# Patient Record
Sex: FEMALE | Race: WHITE | NOT HISPANIC OR LATINO | Employment: FULL TIME | URBAN - METROPOLITAN AREA
[De-identification: names, ages, dates, MRNs, and addresses within clinical notes are randomized per-mention and may not be internally consistent; named-entity substitution may affect disease eponyms.]

---

## 2022-10-18 DIAGNOSIS — Z30.41 ENCOUNTER FOR SURVEILLANCE OF CONTRACEPTIVE PILLS: Primary | ICD-10-CM

## 2022-10-18 RX ORDER — NORETHINDRONE ACETATE AND ETHINYL ESTRADIOL 1.5-30(21)
1 KIT ORAL DAILY
Qty: 84 TABLET | Refills: 1 | Status: SHIPPED | OUTPATIENT
Start: 2022-10-18 | End: 2022-11-30

## 2022-11-07 DIAGNOSIS — Z20.822 COVID-19 RULED OUT BY LABORATORY TESTING: Primary | ICD-10-CM

## 2022-12-05 RX ORDER — SODIUM CHLORIDE, SODIUM LACTATE, POTASSIUM CHLORIDE, CALCIUM CHLORIDE 600; 310; 30; 20 MG/100ML; MG/100ML; MG/100ML; MG/100ML
125 INJECTION, SOLUTION INTRAVENOUS CONTINUOUS
Status: CANCELLED | OUTPATIENT
Start: 2022-12-05

## 2023-01-06 ENCOUNTER — APPOINTMENT (OUTPATIENT)
Dept: PHYSICAL THERAPY | Facility: CLINIC | Age: 48
End: 2023-01-06

## 2023-01-13 ENCOUNTER — APPOINTMENT (OUTPATIENT)
Dept: PHYSICAL THERAPY | Facility: CLINIC | Age: 48
End: 2023-01-13

## 2023-01-19 ENCOUNTER — APPOINTMENT (OUTPATIENT)
Dept: PHYSICAL THERAPY | Facility: CLINIC | Age: 48
End: 2023-01-19

## 2023-02-07 ENCOUNTER — OFFICE VISIT (OUTPATIENT)
Dept: PHYSICAL THERAPY | Facility: CLINIC | Age: 48
End: 2023-02-07

## 2023-02-07 DIAGNOSIS — Z98.890 S/P RIGHT ROTATOR CUFF REPAIR: Primary | ICD-10-CM

## 2023-02-07 DIAGNOSIS — G89.29 CHRONIC RIGHT SHOULDER PAIN: ICD-10-CM

## 2023-02-07 DIAGNOSIS — M25.511 CHRONIC RIGHT SHOULDER PAIN: ICD-10-CM

## 2023-02-07 NOTE — PROGRESS NOTES
Daily Note     Today's date: 2023  Patient name: Ofelia Washburn  : 1975  MRN: [id number]  Referring provider: Abby Toscano*  Dx:   Encounter Diagnosis     ICD-10-CM    1  S/P right rotator cuff repair  Z98 890       2  Chronic right shoulder pain  M25 511     G89 29                      Subjective: Pt reports that she is feeling well this am  Denies any pain  IS pleased with her progress as she is able to move are a little more this morning  Objective: See treatment diary below      Assessment: Tolerated treatment well  Patient demos good motion passively; requires additional strengthening of shoulder muscles in order to be able to engage arm in AROM with no pain or limitations  Pt will continue to benefit from skilled physical therapy in order to optimize strength and improve quality of life  Plan: Continue per plan of care  Precautions: S/P R RTC and biceps tenodesis 2022    BILL 1 UNIT TE ONLY (until )     Past Medical History:   Diagnosis Date   • Anesthesia complication     pt can still hear, describe whats going on in the room, and feel pressure last 2 surgeries  • Asthma    • Candidiasis of mouth     LA   2/26/15    R    16     • Exposure to viral disease     LA   16   R    3/24/16     • Otalgia 2012    LA   12    R   16         Insurance:  AMA/CMS Eval/ Re-eval POC expires Priscila Langford #/ Referral # Total   Visits  Start date  Expiration date Extension  Visit limitation? PT only or  PT+OT?  Co-Insurance   [employer] 12/22 3/16  No auth required     [facility] PT $20 Co-pay                                                               Surgery date:     Date 23 (6 weeks post op 23) 2023   Visit Number 9 10 11 12 13 14            Manual         Scap mobs          Mi of serratus ant and subscap  MI of serratus ant and subscap  MI of serratus ant and subscap  MI of serratus ant and subscap Prescription sent by MD MI of serratus ant and subscap  MI serratus ant and subscap    Gentle GH and AC joint mobility     Performed performed                     Neuro Re-ed         Scap retract  rev 2x10 YTB  2x10 YTB  2 x 10 YTB    Shoulder isos   3sx10 each       Serratus push up on wall     2x10  2x10  2 x 10    Wall slides with towel     2x10  2x10  2 x 10                      Thera Ex         Pulleys  Flexion: 15x,  Retraction: 15x Flexion: 15x,  Retraction: 15x Flexion: 15x,  Retraction: 15x Flexion: 15x,  Retraction: 15x Flexion: 15x,  Retraction: 15x Flexion 15x  rectraction 15x    Shoulder PROM Performed all motions  Performed all motions  Performed all motions  Performed all motions  Performed all motions  Performed all motions    Pendulums  Reviewed         PASSIVE elbow flex/ext  20 x AROM 20 x AROM 20 x AROM  20 x AROM 20 x AROM              Posterior capsule stretch         Self hands clasped flexion          Cane ER Standing: 15x (IR/ER)  Supine 15 x  Standing: 15x (IR/ER)  Supine 15 x  Standing: 15x (IR/ER)  Supine 15 x  Standing: 15x (IR/ER)  Supine 15 x  Standing: 15x (IR/ER)  Supine 15 x  Standing 15x  (IR/ER supine 15x )    Ball rolls on table 20x         Prone row         TB shoulder ext     x10 YTB  X 10 YTB   TB IR/ER walkouts     x10 YTB  X 10 YTB   Cane abd in standing  x10 in standing x10 in standing x10 in standing x10 in standing X 10 in standing    IR BTB with SOS  2x10  2x10  2x10  2x10  2 x 10    Cane ext   2x10  2x10  2x10  2x10  2 x 10    Thera Activity         Patient education         Posture education                                                      Modalities         Heat pre         Ice post

## 2023-02-09 ENCOUNTER — OFFICE VISIT (OUTPATIENT)
Dept: PHYSICAL THERAPY | Facility: CLINIC | Age: 48
End: 2023-02-09

## 2023-02-09 DIAGNOSIS — Z98.890 S/P RIGHT ROTATOR CUFF REPAIR: Primary | ICD-10-CM

## 2023-02-09 DIAGNOSIS — M25.511 CHRONIC RIGHT SHOULDER PAIN: ICD-10-CM

## 2023-02-09 DIAGNOSIS — G89.29 CHRONIC RIGHT SHOULDER PAIN: ICD-10-CM

## 2023-02-09 NOTE — PROGRESS NOTES
Daily Note     Today's date: 2023  Patient name: Ernestina Cornejo  : 1975  MRN: [id number]  Referring provider: Carlo Essex*  Dx:   Encounter Diagnosis     ICD-10-CM    1  S/P right rotator cuff repair  Z98 890       2  Chronic right shoulder pain  M25 511     G89 29           Start Time: 0800  Stop Time: 0845  Total time in clinic (min): 45 minutes    Subjective: Patient was able to wash her hair this morning for the first time with her R UE  Objective: See treatment diary below      Assessment: Tolerated treatment well  Updated exercises today, as she is 8 weeks post operatively  She is able to initiate early strengthening phase of protocol  She is progressing well at this point, with slight pain at available end range  Will continue to progress as able  Plan: Continue per plan of care  Precautions: S/P R RTC and biceps tenodesis 2022    BILL 1 UNIT TE ONLY (until )     Past Medical History:   Diagnosis Date   • Anesthesia complication     pt can still hear, describe whats going on in the room, and feel pressure last 2 surgeries  • Asthma    • Candidiasis of mouth     LA   2/26/15    R    16     • Exposure to viral disease     LA   16   R    3/24/16     • Otalgia 2012    LA   12    R   16         Insurance:  AMA/CMS Eval/ Re-eval POC expires Joaquim Chaves #/ Referral # Total   Visits  Start date  Expiration date Extension  Visit limitation? PT only or  PT+OT?  Co-Insurance   [employer] 12/22 3/16  No auth required     [facility] PT $20 Co-pay                                                               Surgery date:     Date 2023   Visit Number 12 13 15 16          Manual       Scap mobs        MI of serratus ant and subscap  MI of serratus ant and subscap  MI serratus ant and subscap  MI serratus ant and subscap    Gentle GH and AC joint mobility  Performed performed performed                 Neuro Re-ed Scap retract  2x10 YTB  2 x 10 YTB  2 x 10 YTB    Shoulder isos       Serratus push up on wall  2x10  2x10  2 x 10  2x10   Wall slides with towel  2x10  2x10  2 x 10  2x10                 Thera Ex       Pulleys  Flexion: 15x,  Retraction: 15x Flexion: 15x,  Retraction: 15x Flexion 15x  rectraction 15x  Flexion 15x  rectraction 15x    Shoulder PROM Performed all motions  Performed all motions  Performed all motions  Performed all motions    Pendulums        PASSIVE elbow flex/ext   20 x AROM 20 x AROM             Posterior capsule stretch       Self hands clasped flexion        Cane ER Standing: 15x (IR/ER)  Supine 15 x  Standing: 15x (IR/ER)  Supine 15 x  Standing 15x  (IR/ER supine 15x )  Standing 15x   TB shoulder ext  x10 YTB  X 10 YTB 2x10 YTB   TB IR/ER walkouts  x10 YTB  X 10 YTB 2x10 YTB   Cane abd in standing x10 in standing x10 in standing X 10 in standing  2x10 in standing   IR BTB with SOS 2x10  2x10  2 x 10  2 x 10    Cane ext  2x10  2x10  2 x 10  2 x 10    ER is SL    2x10   Prone ext    2x10    Prone row    2x10          Thera Activity       Patient education       Posture education                                          Modalities       Heat pre       Ice post

## 2023-02-14 ENCOUNTER — OFFICE VISIT (OUTPATIENT)
Dept: PHYSICAL THERAPY | Facility: CLINIC | Age: 48
End: 2023-02-14

## 2023-02-14 DIAGNOSIS — M25.511 CHRONIC RIGHT SHOULDER PAIN: ICD-10-CM

## 2023-02-14 DIAGNOSIS — Z98.890 S/P RIGHT ROTATOR CUFF REPAIR: Primary | ICD-10-CM

## 2023-02-14 DIAGNOSIS — G89.29 CHRONIC RIGHT SHOULDER PAIN: ICD-10-CM

## 2023-02-14 NOTE — PROGRESS NOTES
Daily Note     Today's date: 2023  Patient name: Jing Hunter  : 1975  MRN: [id number]  Referring provider: Flor Bishop*  Dx:   Encounter Diagnosis     ICD-10-CM    1  S/P right rotator cuff repair  Z98 890       2  Chronic right shoulder pain  M25 511     G89 29           Start Time: 0800  Stop Time: 0845  Total time in clinic (min): 45 minutes    Subjective: Patient is almost able to wash her hair independently  Objective: See treatment diary below      Assessment: Tolerated treatment well  Patient notes improvements in her R shoulder mobility since starting PT intervention  She continues to lack R shoulder ER and flexion end ranges, but is progressing well at this point of the protocol  Will continue to progress as able  Plan: Continue per plan of care  Precautions: S/P R RTC and biceps tenodesis 2022    BILL 1 UNIT TE ONLY (until )     Past Medical History:   Diagnosis Date   • Anesthesia complication     pt can still hear, describe whats going on in the room, and feel pressure last 2 surgeries  • Asthma    • Candidiasis of mouth     LA   2/26/15    R    16     • Exposure to viral disease     LA   16   R    3/24/16     • Otalgia 2012    LA   12    R   16         Insurance:  AMA/CMS Eval/ Re-eval POC expires Jinny Bidding #/ Referral # Total   Visits  Start date  Expiration date Extension  Visit limitation? PT only or  PT+OT?  Co-Insurance   [employer] 12/22 3/16  No auth required     [facility] PT $20 Co-pay                                                               Surgery date:     Date 2023   Visit Number 17 17 14 17 16           Manual        Scap mobs         MI of serratus ant and subscap  MI of serratus ant and subscap  MI serratus ant and subscap  MI serratus ant and subscap  MI serratus ant and subscap    Gentle GH and AC joint mobility  Performed performed performed performed Neuro Re-ed        Scap retract  2x10 YTB  2 x 10 YTB  2 x 10 YTB  2 x 10 YTB    Shoulder isos        Serratus push up on wall  2x10  2x10  2 x 10  2x10 2x10   Wall slides with towel  2x10  2x10  2 x 10  2x10 2x10                   Thera Ex        Pulleys  Flexion: 15x,  Retraction: 15x Flexion: 15x,  Retraction: 15x Flexion 15x  rectraction 15x  Flexion 15x  rectraction 15x  Flexion 15x  rectraction 15x    Shoulder PROM Performed all motions  Performed all motions  Performed all motions  Performed all motions  Performed all motions    Pendulums         PASSIVE elbow flex/ext   20 x AROM 20 x AROM   20 x AROM  2#            Posterior capsule stretch        Self hands clasped flexion         Cane ER Standing: 15x (IR/ER)  Supine 15 x  Standing: 15x (IR/ER)  Supine 15 x  Standing 15x  (IR/ER supine 15x )  Standing 15x Standing 15x   TB shoulder ext  x10 YTB  X 10 YTB 2x10 YTB 2x10 YTB   TB IR/ER walkouts  x10 YTB  X 10 YTB 2x10 YTB 2x10 YTB   Cane abd in standing x10 in standing x10 in standing X 10 in standing  2x10 in standing 2x10 in standing   IR BTB with SOS 2x10  2x10  2 x 10  2 x 10  2 x 10    Cane ext  2x10  2x10  2 x 10  2 x 10  2 x 10    ER is SL    2x10 2 x 10    Prone ext    2x10  2 x 10    Prone row    2x10 2 x 10            Thera Activity        Patient education        Posture education                                                Modalities        Heat pre        Ice post

## 2023-02-16 ENCOUNTER — OFFICE VISIT (OUTPATIENT)
Dept: PHYSICAL THERAPY | Facility: CLINIC | Age: 48
End: 2023-02-16

## 2023-02-16 DIAGNOSIS — M25.511 CHRONIC RIGHT SHOULDER PAIN: ICD-10-CM

## 2023-02-16 DIAGNOSIS — Z98.890 S/P RIGHT ROTATOR CUFF REPAIR: Primary | ICD-10-CM

## 2023-02-16 DIAGNOSIS — G89.29 CHRONIC RIGHT SHOULDER PAIN: ICD-10-CM

## 2023-02-16 NOTE — PROGRESS NOTES
Daily Note     Today's date: 2023  Patient name: Cecilia Morelos  : 1975  MRN: [id number]  Referring provider: Romayne Rama*  Dx:   Encounter Diagnosis     ICD-10-CM    1  S/P right rotator cuff repair  Z98 890       2  Chronic right shoulder pain  M25 511     G89 29           Start Time: 0800  Stop Time: 0845  Total time in clinic (min): 45 minutes    Subjective: Patient has no new complaints  Objective: See treatment diary below      Assessment: Tolerated treatment well  Patient notes improvements in her R shoulder mobility since starting PT intervention  She demonstrates excellent R shoulder flexion ROM today despite increased pain with R shoulder ER when at neutral arm position  Will continue to progress ROM and strength as able  Plan: Continue per plan of care  Precautions: S/P R RTC and biceps tenodesis 2022    BILL 1 UNIT TE ONLY (until )     Past Medical History:   Diagnosis Date   • Anesthesia complication     pt can still hear, describe whats going on in the room, and feel pressure last 2 surgeries  • Asthma    • Candidiasis of mouth     LA   2/26/15    R    16     • Exposure to viral disease     LA   16   R    3/24/16     • Otalgia 2012    LA   12    R   16         Insurance:  AMA/CMS Eval/ Re-eval POC expires Malva Manus #/ Referral # Total   Visits  Start date  Expiration date Extension  Visit limitation? PT only or  PT+OT?  Co-Insurance   [employer] 12/22 3/16  No auth required     [facility] PT $20 Co-pay                                                               Surgery date:     Date 2023   Visit Number 12 15 17 16 16           Manual        Scap mobs         MI of serratus ant and subscap  MI serratus ant and subscap  MI serratus ant and subscap  MI serratus ant and subscap     Gentle GH and AC joint mobility Performed performed performed performed                    Neuro Re-ed Scap retract   2 x 10 YTB  2 x 10 YTB  2 x 10 YTB  2 x 10 YTB    Shoulder isos        Serratus push up on wall  2x10  2 x 10  2x10 2x10 2x10   Wall slides with towel  2x10  2 x 10  2x10 2x10 2x10                   Thera Ex        Pulleys  Flexion: 15x,  Retraction: 15x Flexion 15x  rectraction 15x  Flexion 15x  rectraction 15x  Flexion 15x  rectraction 15x  Flexion 15x  rectraction 15x    Shoulder PROM Performed all motions  Performed all motions  Performed all motions  Performed all motions  Performed all motions    Pendulums         PASSIVE elbow flex/ext  20 x AROM 20 x AROM   20 x AROM  2# 20 x AROM  2#            Posterior capsule stretch        Self hands clasped flexion         Cane ER Standing: 15x (IR/ER)  Supine 15 x  Standing 15x  (IR/ER supine 15x )  Standing 15x Standing 15x Standing 15x   TB shoulder ext   X 10 YTB 2x10 YTB 2x10 YTB 2x10 YTB   TB IR/ER walkouts   X 10 YTB 2x10 YTB 2x10 YTB 2x10 YTB   Cane abd in standing x10 in standing X 10 in standing  2x10 in standing 2x10 in standing 2x10 in standing   IR BTB with SOS 2x10  2 x 10  2 x 10  2 x 10  2 x 10    Cane ext  2x10  2 x 10  2 x 10  2 x 10  2 x 10    ER is SL   2x10 2 x 10  2 x 10    Prone ext   2x10  2 x 10  2 x 10    Prone row   2x10 2 x 10  2 x 10            Thera Activity        Patient education        Posture education                                                Modalities        Heat pre        Ice post

## 2023-02-21 ENCOUNTER — OFFICE VISIT (OUTPATIENT)
Dept: PHYSICAL THERAPY | Facility: CLINIC | Age: 48
End: 2023-02-21

## 2023-02-21 DIAGNOSIS — G89.29 CHRONIC RIGHT SHOULDER PAIN: ICD-10-CM

## 2023-02-21 DIAGNOSIS — Z98.890 S/P RIGHT ROTATOR CUFF REPAIR: Primary | ICD-10-CM

## 2023-02-21 DIAGNOSIS — M25.511 CHRONIC RIGHT SHOULDER PAIN: ICD-10-CM

## 2023-02-21 NOTE — PROGRESS NOTES
Daily Note     Today's date: 2023  Patient name: Gloria Galdamez  : 1975  MRN: [id number]  Referring provider: Perry Pearson*  Dx:   Encounter Diagnosis     ICD-10-CM    1  S/P right rotator cuff repair  Z98 890       2  Chronic right shoulder pain  M25 511     G89 29           Start Time: 0840  Stop Time: 0930  Total time in clinic (min): 50 minutes    Subjective: Patient has no new complaints  She is doing her first painting coming up  Objective: See treatment diary below      Assessment: Tolerated treatment well  Patient notes improvements in her R shoulder mobility since starting PT intervention  Significant tightness of subscapularis and lat noted with overhead mobility activities  Will continue to progress ROM and strength as able  Plan: Continue per plan of care  Precautions: S/P R RTC and biceps tenodesis 2022    BILL 1 UNIT TE ONLY (until )     Past Medical History:   Diagnosis Date   • Anesthesia complication     pt can still hear, describe whats going on in the room, and feel pressure last 2 surgeries  • Asthma    • Candidiasis of mouth     LA   2/26/15    R    16     • Exposure to viral disease     LA   16   R    3/24/16     • Otalgia 2012    LA   12    R   16         Insurance:  AMA/CMS Eval/ Re-eval POC expires Tamara Quezada #/ Referral # Total   Visits  Start date  Expiration date Extension  Visit limitation? PT only or  PT+OT?  Co-Insurance   [employer] 12/22 3/16  No auth required     [facility] PT $20 Co-pay                                                               Surgery date:     Date 2023   Visit Number 12 15 17 16 13 23            Manual         Scap mobs          MI of serratus ant and subscap  MI serratus ant and subscap  MI serratus ant and subscap  MI serratus ant and subscap   MI serratus ant and subscap    Gentle GH and AC joint mobility Performed performed performed performed  performed                     Neuro Re-ed         Scap retract   2 x 10 YTB  2 x 10 YTB  2 x 10 YTB  2 x 10 YTB  2 x 10 YTB    Shoulder isos         Serratus push up on wall  2x10  2 x 10  2x10 2x10 2x10 2x10   Wall slides with towel  2x10  2 x 10  2x10 2x10 2x10 2x10                     Thera Ex         Pulleys  Flexion: 15x,  Retraction: 15x Flexion 15x  rectraction 15x  Flexion 15x  rectraction 15x  Flexion 15x  rectraction 15x  Flexion 15x  rectraction 15x  Flexion 15x  rectraction 15x    Shoulder PROM Performed all motions  Performed all motions  Performed all motions  Performed all motions  Performed all motions  Performed all motions    Pendulums          PASSIVE elbow flex/ext  20 x AROM 20 x AROM   20 x AROM  2# 20 x AROM  2# 20 x AROM  2#             Posterior capsule stretch         Self hands clasped flexion          Cane ER Standing: 15x (IR/ER)  Supine 15 x  Standing 15x  (IR/ER supine 15x )  Standing 15x Standing 15x Standing 15x Standing 15x   TB shoulder ext   X 10 YTB 2x10 YTB 2x10 YTB 2x10 YTB 2x10 YTB   TB IR/ER walkouts   X 10 YTB 2x10 YTB 2x10 YTB 2x10 YTB 2x10 YTB   Cane abd in standing x10 in standing X 10 in standing  2x10 in standing 2x10 in standing 2x10 in standing 2x10 in standing   IR BTB with SOS 2x10  2 x 10  2 x 10  2 x 10  2 x 10  2 x 10    Cane ext  2x10  2 x 10  2 x 10  2 x 10  2 x 10  2 x 10    ER is SL   2x10 2 x 10  2 x 10  2 x 10    Prone ext   2x10  2 x 10  2 x 10  2 x 10    Prone row   2x10 2 x 10  2 x 10  2 x 10             Thera Activity         Patient education         Posture education                                                      Modalities         Heat pre         Ice post

## 2023-02-22 NOTE — PROGRESS NOTES
Diagnoses and all orders for this visit:    Grade II hemorrhoids       Grade II hemorrhoids  Patient presents for evaluation of hemorrhoidal concerns  Patient notes he has intermittent burning or itching  She notes she had an episode of hemorrhoidal prolapse many months ago and since then she has remnant skin tags  Patient also notes she has had some degree of hemorrhoidal concerns since childhood  Physical exam shows to grade 2 internal/external hemorrhoidal columns  We discussed her history and examination  Excisional hemorrhoidectomy would be the only treatment available to treat her internal/external hemorrhoidal columns  Patient has very minimal bleeding from this and does have more symptoms of intermittent discomfort that may be more pleuritic in nature and not respond well to surgical intervention  Patient does describe that she had a hard painful lump many months ago  I suspect this was a thrombosed external hemorrhoid  As such there would be no indication acutely for intervention for this kind of history  I do think that surgery would have some role in her symptomatology but we discussed that this is a very unpleasant procedure to have done and would only have some benefit into her symptoms  At this point in time we will observe and she will call if symptoms worsen over time  HPI   Macho Jules is here today for evaluation of hemorrhoids  Patient complains of internal hemorrhoid that prolapses causing some discomfort  Family history of colon cancer in her mother at age 61  Her most recent colonoscopy was 12/6/2022 with Dr Jayme Youngblood, which showed mild rectal polyp removed  Grade II internal hemorrhoids  Normal colonic mucosa  Recall colonoscopy 5 years  Past Medical History:   Diagnosis Date   • Anesthesia complication     pt can still hear, describe whats going on in the room, and feel pressure last 2 surgeries  • Asthma    • Candidiasis of mouth     LA   2/26/15 R  Dara Soulier Dara Soulier Dara Marilinjaniya Shella Soulier 16     • Exposure to viral disease     LA   16   R    3/24/16     • Otalgia 2012    LA   12    R   16       Past Surgical History:   Procedure Laterality Date   •  SECTION     • DILATION AND EVACUATION     • NASAL FRACTURE SURGERY     • PA SURGICAL ARTHROSCOPY SHOULDER W/ROTATOR CUFF RPR Right 2022    Procedure: Shoulder Arthroscopy with Rotator Cuff Repair, Biceps tenodesis, and subacromial decompression;  Surgeon: Jeannie Hernandez MD;  Location: [address] [place] [address];  Service: Orthopedics   • TONSILLECTOMY     • WRIST GANGLION EXCISION Left        Current Outpatient Medications:   •  albuterol (PROVENTIL HFA,VENTOLIN HFA) 90 mcg/act inhaler, INHALE 2 PUFFS EVERY 6 HOURS AS NEEDED FOR WHEEZING, Disp: 18 g, Rfl: 1  •  Dulera 100-5 MCG/ACT inhaler, INHALE 2 PUFFS 2 (TWO) TIMES A DAY RINSE MOUTH AFTER USE  (Patient taking differently: 2 (two) times a day as needed), Disp: 39 Inhaler, Rfl: 1  •  EPINEPHrine (EPIPEN) 0 3 mg/0 3 mL SOAJ, Inject 0 3 mL (0 3 mg total) into a muscle once for 1 dose, Disp: 0 6 mL, Rfl: 0  •  levocetirizine (XYZAL) 5 MG tablet, Take 1 tablet (5 mg total) by mouth daily, Disp: 90 tablet, Rfl: 1  •  ZOLMitriptan (ZOMIG) 5 MG tablet, TAKE 1 TABLET BY MOUTH EVERY DAY AS NEEDED FOR MIGRAINE, Disp: 6 tablet, Rfl: 3  Allergies as of 2023 - Reviewed 2023   Allergen Reaction Noted   • Treenut [nuts - food allergy] Anaphylaxis 2022     Review of Systems   Gastrointestinal: Positive for anal bleeding and rectal pain  All other systems reviewed and are negative  There were no vitals filed for this visit  Physical Exam  Constitutional:       Appearance: Normal appearance  HENT:      Head: Normocephalic and atraumatic  Eyes:      Extraocular Movements: Extraocular movements intact  Pupils: Pupils are equal, round, and reactive to light  Musculoskeletal:         General: Normal range of motion     Skin:     General: Skin is warm and dry  Neurological:      General: No focal deficit present  Mental Status: She is alert and oriented to person, place, and time  Psychiatric:         Mood and Affect: Mood normal          Behavior: Behavior normal          Thought Content: Thought content normal          Judgment: Judgment normal      Lower Endoscopy    Date/Time: 2/24/2023 4:45 PM  Performed by: Leydi Menard MD  Authorized by: Leydi Menard MD     Verbal consent obtained?: Yes    Risks and benefits: Risks, benefits and alternatives were discussed    Consent given by:  Patient  Indications: hemorrhoids    Patient sedated: No    Scope type:   Anoscope  External exam performed: Yes    External hemorrhoids: Yes    Digital exam performed: Yes    Laxity of anal sphincter: No    Internal hemorrhoids: Yes    Prolapsed: No    Procedure termination:  Procedure complete  Patient tolerance:  Patient tolerated the procedure well with no immediate complications   Grade 2 internal/external hemorrhoidal columns in the left lateral and right anterior lateral positions

## 2023-02-23 ENCOUNTER — OFFICE VISIT (OUTPATIENT)
Dept: PHYSICAL THERAPY | Facility: CLINIC | Age: 48
End: 2023-02-23

## 2023-02-23 DIAGNOSIS — Z98.890 S/P RIGHT ROTATOR CUFF REPAIR: Primary | ICD-10-CM

## 2023-02-23 DIAGNOSIS — M25.511 CHRONIC RIGHT SHOULDER PAIN: ICD-10-CM

## 2023-02-23 DIAGNOSIS — G89.29 CHRONIC RIGHT SHOULDER PAIN: ICD-10-CM

## 2023-02-23 NOTE — PROGRESS NOTES
Daily Note     Today's date: 2023  Patient name: Taylor Rose  : 1975  MRN: [id number]  Referring provider: Cesar Lagunas*  Dx:   Encounter Diagnosis     ICD-10-CM    1  S/P right rotator cuff repair  Z98 890       2  Chronic right shoulder pain  M25 511     G89 29           Start Time: 0800  Stop Time: 0845  Total time in clinic (min): 45 minutes    Subjective: Patient reports increased soreness of her R shoulder today after last session  Objective: See treatment diary below      Assessment: Tolerated treatment well  Added active motion today for her R shoulder, with no compensations noted with overhead movement  Patient is progressing well at this time  Will continue to progress as per protocol  Plan: Continue per plan of care  Precautions: S/P R RTC and biceps tenodesis 2022    BILL 1 UNIT TE ONLY (until )     Past Medical History:   Diagnosis Date   • Anesthesia complication     pt can still hear, describe whats going on in the room, and feel pressure last 2 surgeries  • Asthma    • Candidiasis of mouth     LA   2/26/15    R    16     • Exposure to viral disease     LA   16   R    3/24/16     • Otalgia 2012    LA   12    R   16         Insurance:  AMA/CMS Eval/ Re-eval POC expires Adela Rush #/ Referral # Total   Visits  Start date  Expiration date Extension  Visit limitation? PT only or  PT+OT?  Co-Insurance   [employer] 12/22 3/16  No auth required     [facility] PT $20 Co-pay                                                               Surgery date:     Date 23   Visit Number 15 15 18           Manual       Scap mobs        MI serratus ant and subscap   MI serratus ant and subscap  MI serratus ant and subscap    Gentle GH and AC joint mobility performed  performed performed                 Neuro Re-ed       Scap retract  2 x 10 YTB  2 x 10 YTB  2 x 10 YTB  2 x 10 YTB    Shoulder isos       Serratus push up on wall  2x10 2x10 2x10 2x10   Wall slides with towel  2x10 2x10 2x10 2x10                 Thera Ex       Pulleys  Flexion 15x  rectraction 15x  Flexion 15x  rectraction 15x  Flexion 15x  rectraction 15x  Flexion 15x  rectraction 15x    Shoulder PROM Performed all motions  Performed all motions  Performed all motions  Performed all motions    Pendulums        PASSIVE elbow flex/ext  20 x AROM  2# 20 x AROM  2# 20 x AROM  2#            Posterior capsule stretch       Shoulder flex, abd, scaption    x10 each    Cane ER Standing 15x Standing 15x Standing 15x Standing 15x   TB shoulder ext  2x10 YTB 2x10 YTB 2x10 YTB 2x10 YTB   TB IR/ER walkouts  2x10 YTB 2x10 YTB 2x10 YTB 2x10 YTB   Cane abd in standing 2x10 in standing 2x10 in standing 2x10 in standing 2x10 in standing   IR BTB with SOS 2 x 10  2 x 10  2 x 10  2 x 10    Cane ext  2 x 10  2 x 10  2 x 10  2 x 10    ER is SL 2 x 10  2 x 10  2 x 10  2 x 10    Prone ext 2 x 10  2 x 10  2 x 10  2 x 10    Prone row 2 x 10  2 x 10  2 x 10  2 x 10           Thera Activity       Patient education       Posture education                                          Modalities       Heat pre       Ice post

## 2023-02-24 ENCOUNTER — OFFICE VISIT (OUTPATIENT)
Age: 48
End: 2023-02-24

## 2023-02-24 VITALS — WEIGHT: 153 LBS | BODY MASS INDEX: 25.49 KG/M2 | HEIGHT: 65 IN

## 2023-02-24 DIAGNOSIS — K64.1 GRADE II HEMORRHOIDS: Primary | ICD-10-CM

## 2023-02-24 NOTE — ASSESSMENT & PLAN NOTE
Patient presents for evaluation of hemorrhoidal concerns  Patient notes he has intermittent burning or itching  She notes she had an episode of hemorrhoidal prolapse many months ago and since then she has remnant skin tags  Patient also notes she has had some degree of hemorrhoidal concerns since childhood  Physical exam shows to grade 2 internal/external hemorrhoidal columns  We discussed her history and examination  Excisional hemorrhoidectomy would be the only treatment available to treat her internal/external hemorrhoidal columns  Patient has very minimal bleeding from this and does have more symptoms of intermittent discomfort that may be more pleuritic in nature and not respond well to surgical intervention  Patient does describe that she had a hard painful lump many months ago  I suspect this was a thrombosed external hemorrhoid  As such there would be no indication acutely for intervention for this kind of history  I do think that surgery would have some role in her symptomatology but we discussed that this is a very unpleasant procedure to have done and would only have some benefit into her symptoms  At this point in time we will observe and she will call if symptoms worsen over time

## 2023-02-28 ENCOUNTER — OFFICE VISIT (OUTPATIENT)
Dept: PHYSICAL THERAPY | Facility: CLINIC | Age: 48
End: 2023-02-28

## 2023-02-28 DIAGNOSIS — M25.511 CHRONIC RIGHT SHOULDER PAIN: ICD-10-CM

## 2023-02-28 DIAGNOSIS — G89.29 CHRONIC RIGHT SHOULDER PAIN: ICD-10-CM

## 2023-02-28 DIAGNOSIS — Z98.890 S/P RIGHT ROTATOR CUFF REPAIR: Primary | ICD-10-CM

## 2023-02-28 NOTE — PROGRESS NOTES
Daily Note     Today's date: 2023  Patient name: Karen Andersen  : 1975  MRN: [id number]  Referring provider: Yaima Jerez*  Dx:   Encounter Diagnosis     ICD-10-CM    1  S/P right rotator cuff repair  Z98 890       2  Chronic right shoulder pain  M25 511     G89 29           Start Time: 0807  Stop Time: 0850  Total time in clinic (min): 43 minutes    Subjective: Patient cleaned off her car prior to today's session  She notes it was rather difficult due to lack of mobility and strength  Objective: See treatment diary below      Assessment: Tolerated treatment well  Added TRX pull ups today with no adverse effects  Patient able to perform today with no increased pain throughout  Tactile cueing required for proper scapular mechanics  Will continue to progress as able  Plan: Continue per plan of care  Precautions: S/P R RTC and biceps tenodesis 2022    BILL 1 UNIT TE ONLY (until )     Past Medical History:   Diagnosis Date   • Anesthesia complication     pt can still hear, describe whats going on in the room, and feel pressure last 2 surgeries  • Asthma    • Candidiasis of mouth     LA   2/26/15    R    16     • Exposure to viral disease     LA   16   R    3/24/16     • Otalgia 2012    LA   12    R   16         Insurance:  AMA/CMS Eval/ Re-eval POC expires Luca Roy #/ Referral # Total   Visits  Start date  Expiration date Extension  Visit limitation? PT only or  PT+OT?  Co-Insurance   [employer] 12/22 3/16  No auth required     [facility] PT $20 Co-pay                                                               Surgery date:     Date 23   Visit Number 15 15 18 23 25           Manual        Scap mobs         MI serratus ant and subscap   MI serratus ant and subscap  MI serratus ant and subscap     Gentle GH and AC joint mobility performed  performed performed                    Neuro Re-ed        Scap retract  2 x 10 YTB  2 x 10 YTB  2 x 10 YTB  2 x 10 YTB     Shoulder isos        Serratus push up on wall  2x10 2x10 2x10 2x10 2x10   Wall slides with towel  2x10 2x10 2x10 2x10 2x10                   Thera Ex        Pulleys  Flexion 15x  rectraction 15x  Flexion 15x  rectraction 15x  Flexion 15x  rectraction 15x  Flexion 15x  rectraction 15x  Flexion 15x  rectraction 15x    Shoulder PROM Performed all motions  Performed all motions  Performed all motions  Performed all motions     Pendulums         PASSIVE elbow flex/ext  20 x AROM  2# 20 x AROM  2# 20 x AROM  2#              Posterior capsule stretch        Shoulder flex, abd, scaption    x10 each     Cane ER Standing 15x Standing 15x Standing 15x Standing 15x Standing 15x   TB shoulder ext  2x10 YTB 2x10 YTB 2x10 YTB 2x10 YTB    TB IR/ER walkouts  2x10 YTB 2x10 YTB 2x10 YTB 2x10 YTB    Cane abd in standing 2x10 in standing 2x10 in standing 2x10 in standing 2x10 in standing 2x10 in standing   IR BTB with SOS 2 x 10  2 x 10  2 x 10  2 x 10  2 x 10    Cane ext  2 x 10  2 x 10  2 x 10  2 x 10  2 x 10    ER is SL 2 x 10  2 x 10  2 x 10  2 x 10  2 x 10    Prone ext 2 x 10  2 x 10  2 x 10  2 x 10  2 x 10    Prone row 2 x 10  2 x 10  2 x 10  2 x 10  2 x 10    Lat stretch single arm CC     5sx10   TRX pull up/push up     x10 each    Thera Activity        Patient education        Posture education                                                Modalities        Heat pre        Ice post

## 2023-03-02 ENCOUNTER — OFFICE VISIT (OUTPATIENT)
Dept: PHYSICAL THERAPY | Facility: CLINIC | Age: 48
End: 2023-03-02

## 2023-03-02 DIAGNOSIS — M25.511 CHRONIC RIGHT SHOULDER PAIN: ICD-10-CM

## 2023-03-02 DIAGNOSIS — G89.29 CHRONIC RIGHT SHOULDER PAIN: ICD-10-CM

## 2023-03-02 DIAGNOSIS — Z98.890 S/P RIGHT ROTATOR CUFF REPAIR: Primary | ICD-10-CM

## 2023-03-02 NOTE — PROGRESS NOTES
Daily Note     Today's date: 3/2/2023  Patient name: Ryann Salguero  : 1975  MRN: [id number]  Referring provider: Westley Carlson*  Dx:   Encounter Diagnosis     ICD-10-CM    1  S/P right rotator cuff repair  Z98 890       2  Chronic right shoulder pain  M25 511     G89 29           Start Time: 0850  Stop Time: 0930  Total time in clinic (min): 40 minutes    Subjective: Patient reports she feels like shes "hitting a wall" when it comes to her ROM       Objective: See treatment diary below      Assessment: Tolerated treatment well  Pt demo good tolerance with exercises, started session with ROM exercises, pt was limited with shld flex past 110 deg in standing  Will continue to progress as able  Plan: Continue per plan of care  Precautions: S/P R RTC and biceps tenodesis 2022    BILL 1 UNIT TE ONLY (until )     Past Medical History:   Diagnosis Date   • Anesthesia complication     pt can still hear, describe whats going on in the room, and feel pressure last 2 surgeries  • Asthma    • Candidiasis of mouth     LA   2/26/15    R    16     • Exposure to viral disease     LA   16   R    3/24/16     • Otalgia 2012    LA   12    R   16         Insurance:  AMA/CMS Eval/ Re-eval POC expires Rasheeda Smallwood #/ Referral # Total   Visits  Start date  Expiration date Extension  Visit limitation? PT only or  PT+OT?  Co-Insurance   [employer] 12/22 3/16  No auth required     [facility] PT $20 Co-pay                                                               Surgery date:     Date 2/16/23 2/21/23 2/23/23 2/28/23 3/2/23   Visit Number 13 23 19 20 21           Manual        Scap mobs          MI serratus ant and subscap  MI serratus ant and subscap      Gentle GH and AC joint mobility  performed performed                     Neuro Re-ed        Scap retract  2 x 10 YTB  2 x 10 YTB  2 x 10 YTB      Shoulder isos        Serratus push up on wall  2x10 2x10 2x10 2x10 On table 2x10   Wall slides with towel  2x10 2x10 2x10 2x10 2x10                   Thera Ex        Pulleys  Flexion 15x  rectraction 15x  Flexion 15x  rectraction 15x  Flexion 15x  rectraction 15x  Flexion 15x  rectraction 15x  Flexion 15x  rectraction 15x    Shoulder PROM Performed all motions  Performed all motions  Performed all motions      Pendulums         PASSIVE elbow flex/ext  20 x AROM  2# 20 x AROM  2#               Posterior capsule stretch        Shoulder flex, abd, scaption   x10 each      Cane ER Standing 15x Standing 15x Standing 15x Standing 15x Standing 15x   TB shoulder ext  2x10 YTB 2x10 YTB 2x10 YTB  2x10 RTB   TB IR/ER walkouts  2x10 YTB 2x10 YTB 2x10 YTB  2x10 RTB   Cane abd in standing 2x10 in standing 2x10 in standing 2x10 in standing 2x10 in standing 2x10   IR BTB with SOS 2 x 10  2 x 10  2 x 10  2 x 10  2x10   Cane ext  2 x 10  2 x 10  2 x 10  2 x 10  2x10   ER is SL 2 x 10  2 x 10  2 x 10  2 x 10  2x10   Prone ext 2 x 10  2 x 10  2 x 10  2 x 10  2x10   Prone row 2 x 10  2 x 10  2 x 10  2 x 10  2x10   Lat stretch single arm CC    5sx10 5sx10   TRX pull up/push up    x10 each  10x   Thera Activity        Patient education        Posture education                                                Modalities        Heat pre        Ice post

## 2023-03-06 ENCOUNTER — OFFICE VISIT (OUTPATIENT)
Dept: PHYSICAL THERAPY | Facility: CLINIC | Age: 48
End: 2023-03-06

## 2023-03-06 DIAGNOSIS — G89.29 CHRONIC RIGHT SHOULDER PAIN: ICD-10-CM

## 2023-03-06 DIAGNOSIS — M25.511 CHRONIC RIGHT SHOULDER PAIN: ICD-10-CM

## 2023-03-06 DIAGNOSIS — Z98.890 S/P RIGHT ROTATOR CUFF REPAIR: Primary | ICD-10-CM

## 2023-03-06 NOTE — PROGRESS NOTES
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      Objective: See treatment diary below      Assessment: Tolerated treatment well  Patient would benefit from continued PT in order to strengthen shoulder girdle  Patient challenged by standing abduction with cane and needed cerbal cueing in order to keep shoulder town  Plan: Continue per plan of care  Precautions: S/P R RTC and biceps tenodesis 2022    BILL 1 UNIT TE ONLY (until )     Past Medical History:   Diagnosis Date   • Anesthesia complication     pt can still hear, describe whats going on in the room, and feel pressure last 2 surgeries  • Asthma    • Candidiasis of mouth     LA   2/26/15    R    16     • Exposure to viral disease     LA   16   R    3/24/16     • Otalgia 2012    LA   12    R   16         Insurance:  AMA/CMS Eval/ Re-eval POC expires [facility] #/ Referral # Total   Visits  Start date  Expiration date Extension  Visit limitation? PT only or  PT+OT?  Co-Insurance   [employer] 12/22 3/16  No auth required     [facility] PT $20 Co-pay                                                               Surgery date:     Date 2/21/23 2/23/23 2/28/23 3/2/23 3/6/23   Visit Number 20 25 20 21 22           Manual        Scap mobs         MI serratus ant and subscap  MI serratus ant and subscap       Gentle GH and AC joint mobility performed performed                      Neuro Re-ed        Scap retract  2 x 10 YTB  2 x 10 YTB       Shoulder isos        Serratus push up on wall  2x10 2x10 2x10 On table 2x10 On table 2*10   Wall slides with towel  2x10 2x10 2x10 2x10 2*10                   Thera Ex        Pulleys  Flexion 15x  rectraction 15x  Flexion 15x  rectraction 15x  Flexion 15x  rectraction 15x  Flexion 15x  rectraction 15x  Flexion 15x  Retraction 15x   Shoulder PROM Performed all motions  Performed all motions       Pendulums         PASSIVE elbow flex/ext  20 x AROM  2#                Posterior capsule stretch        Shoulder flex, abd, scaption  x10 each       Cane ER Standing 15x Standing 15x Standing 15x Standing 15x Standing 15x    TB shoulder ext  2x10 YTB 2x10 YTB  2x10 RTB 2*10 RTB  2*10 RTB row   TB IR/ER walkouts  2x10 YTB 2x10 YTB  2x10 RTB 2*10 RTB   Cane abd in standing 2x10 in standing 2x10 in standing 2x10 in standing 2x10    IR BTB with SOS 2 x 10  2 x 10  2 x 10  2x10 2*10   Cane ext  2 x 10  2 x 10  2 x 10  2x10 2*10   ER is SL 2 x 10  2 x 10  2 x 10  2x10 2*10   Prone ext 2 x 10  2 x 10  2 x 10  2x10 2*10   Prone row 2 x 10  2 x 10  2 x 10  2x10 2*10   Lat stretch single arm CC   5sx10 5sx10 5s x 10   TRX pull up/push up   x10 each  10x 10x ea   Thera Activity        Patient education        Posture education                                                Modalities        Heat pre        Ice post

## 2023-03-07 ENCOUNTER — APPOINTMENT (OUTPATIENT)
Dept: PHYSICAL THERAPY | Facility: CLINIC | Age: 48
End: 2023-03-07

## 2023-03-09 ENCOUNTER — APPOINTMENT (OUTPATIENT)
Dept: PHYSICAL THERAPY | Facility: CLINIC | Age: 48
End: 2023-03-09

## 2023-03-14 ENCOUNTER — APPOINTMENT (OUTPATIENT)
Dept: PHYSICAL THERAPY | Facility: CLINIC | Age: 48
End: 2023-03-14

## 2023-03-15 ENCOUNTER — OFFICE VISIT (OUTPATIENT)
Dept: OBGYN CLINIC | Facility: CLINIC | Age: 48
End: 2023-03-15

## 2023-03-15 ENCOUNTER — TELEPHONE (OUTPATIENT)
Dept: OBGYN CLINIC | Facility: CLINIC | Age: 48
End: 2023-03-15

## 2023-03-15 VITALS
HEART RATE: 51 BPM | WEIGHT: 153 LBS | BODY MASS INDEX: 25.49 KG/M2 | HEIGHT: 65 IN | SYSTOLIC BLOOD PRESSURE: 131 MMHG | DIASTOLIC BLOOD PRESSURE: 84 MMHG

## 2023-03-15 DIAGNOSIS — M75.01 ADHESIVE CAPSULITIS OF RIGHT SHOULDER: ICD-10-CM

## 2023-03-15 DIAGNOSIS — Z98.890 S/P RIGHT ROTATOR CUFF REPAIR: Primary | ICD-10-CM

## 2023-03-15 NOTE — LETTER
March 15, 2023     Patient: Hiram Hawk  YOB: 1975  Date of Visit: 3/15/2023      To Whom it May Concern:    Kishore Urban is under my professional care  Hunter Favre was seen in my office on 3/15/2023  Freeman Favre remains restricted from work  There is an anticipated return in June, 2023  If you have any questions or concerns, please don't hesitate to call           Sincerely,          Angélica Reilly MD        CC: No Recipients

## 2023-03-15 NOTE — PROGRESS NOTES
Assessment/Plan:  1  S/P right rotator cuff repair  Ambulatory Referral to Physical Therapy    Case request operating room: Right Shoulder Manipulation Under Anesthesia    Case request operating room: Right Shoulder Manipulation Under Anesthesia      2  Adhesive capsulitis of right shoulder  Ambulatory Referral to Physical Therapy    Case request operating room: Right Shoulder Manipulation Under Anesthesia    Case request operating room: Right Shoulder Manipulation Under Anesthesia        Scribe Attestation    I,:  Petegordy Davisanne Derek am acting as a scribe while in the presence of the attending physician :       I,:  Cortney Lantigua MD personally performed the services described in this documentation    as scribed in my presence :             Shannan's physical therapy reports were reviewed  She continues to struggle with range of motion  In my opinion she has developed adhesive capsulitis postoperatively  We discussed on how to proceed forward  She can continue on this current path with physical therapy versus having a manipulation under esthesia with subsequent therapy  I discussed the procedure in detail  The procedure would be performed under anesthesia with a nerve block  Gentle manipulation will be performed by me to release scar tissue  She will be in a sling following the procedure until the nerve block wears off  Once the block has worn off I will encourage her to move the shoulder as much as tolerated  She will be in physical therapy the day following the procedure and will attend 5 days/week over a 2-week period to maintain motion gained following the manipulation  Risk of the procedure are inclusive of but not limited to bleeding,nerve injury, fracture, worsening of symptoms, not achieving the anticipated results, persistent stiffness, weakness and the need for additional surgery   The patient verbally stated she understood those risks and would like to proceed with the surgery  Subjective:   Fide Do is a 49 y o  female who presents to the office today for postop evaluation of the right shoulder arthroscopy with rotator cuff repair and subacromial decompression  She is now 14 weeks out from surgery  She states she is doing fairly well  She still complains of mild soreness particularly when reaching for objects  She also complains of pain at end range of motion  [facility] states she feels her progress has plateaued of late  She has been making steady progress and unfortunately this progress has stalled  [facility]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 with the arm supported  She states this stiffness has been limiting her  She obviously cannot paint overhead or at any elevated level  She is very passionate about her art and expresses frustration over the inability to perform as needed  Review of Systems   Constitutional: Negative for chills, fever and unexpected weight change  HENT: Negative for hearing loss, nosebleeds and sore throat  Eyes: Negative for pain, redness and visual disturbance  Respiratory: Negative for cough, shortness of breath and wheezing  Cardiovascular: Negative for chest pain, palpitations and leg swelling  Gastrointestinal: Negative for abdominal pain, nausea and vomiting  Endocrine: Negative for polydipsia and polyuria  Genitourinary: Negative for dysuria and hematuria  Musculoskeletal:        See HPI   Skin: Negative for rash and wound  Neurological: Negative for dizziness, numbness and headaches  Psychiatric/Behavioral: Negative for decreased concentration and suicidal ideas  The patient is not nervous/anxious  Past Medical History:   Diagnosis Date   • Anesthesia complication     pt can still hear, describe whats going on in the room, and feel pressure last 2 surgeries  • Asthma    • Candidiasis of mouth     LA   2/26/15    R    2/25/16     • Exposure to viral disease ADALBERTO Roberts 16   R    3/24/16     • Otalgia 2012    LA   12    R   16         Past Surgical History:   Procedure Laterality Date   •  SECTION     • DILATION AND EVACUATION     • NASAL FRACTURE SURGERY     • [place] SURGICAL ARTHROSCOPY SHOULDER W/ROTATOR CUFF RPR Right 2022    Procedure: Shoulder Arthroscopy with Rotator Cuff Repair, Biceps tenodesis, and subacromial decompression;  Surgeon: Carolyn Ignacio MD;  Location: [address] [place] [address];  Service: Orthopedics   • TONSILLECTOMY     • WRIST GANGLION EXCISION Left        Family History   Problem Relation Age of Onset   • Colon cancer Mother 61   • Hypertension Mother    • Depression Mother    • COPD Mother    • Cancer Mother    • Rheum arthritis Father    • Hyperlipidemia Father    • Depression Father    • Brain cancer Maternal Grandmother    • Diabetes type II Maternal Grandfather    • Diabetes Maternal Grandfather    • Lung cancer Paternal Uncle    • No Known Problems Maternal Uncle        Social History     Occupational History   • Not on file   Tobacco Use   • Smoking status: Former     Packs/day:      Years:      Pack years:      Types: Cigarettes     Start date: 1987     Quit date: 1994     Years since quittin 2   • Smokeless tobacco: Never   Vaping Use   • Vaping Use: Never used   Substance and Sexual Activity   • Alcohol use:  Yes     Alcohol/week: 4 0 standard drinks     Types: 4 Glasses of wine per week     Comment: social    • Drug use: Yes     Types: Marijuana   • Sexual activity: Yes     Partners: Male     Birth control/protection: Male Sterilization         Current Outpatient Medications:   •  albuterol (PROVENTIL HFA,VENTOLIN HFA) 90 mcg/act inhaler, INHALE 2 PUFFS EVERY 6 HOURS AS NEEDED FOR WHEEZING, Disp: 18 g, Rfl: 1  •  Dulera 100-5 MCG/ACT inhaler, INHALE 2 PUFFS 2 (TWO) TIMES A DAY RINSE MOUTH AFTER USE  (Patient taking differently: 2 (two) times a day as needed), Disp: 39 Inhaler, Rfl: 1  •  levocetirizine (XYZAL) 5 MG tablet, Take 1 tablet (5 mg total) by mouth daily, Disp: 90 tablet, Rfl: 1  •  ZOLMitriptan (ZOMIG) 5 MG tablet, TAKE 1 TABLET BY MOUTH EVERY DAY AS NEEDED FOR MIGRAINE, Disp: 6 tablet, Rfl: 3  •  EPINEPHrine (EPIPEN) 0 3 mg/0 3 mL SOAJ, Inject 0 3 mL (0 3 mg total) into a muscle once for 1 dose, Disp: 0 6 mL, Rfl: 0    Allergies   Allergen Reactions   • [place] [Nuts - Food Allergy] Anaphylaxis       Objective:  Vitals:    03/15/23 1027   BP: 131/84   Pulse: (!) 51       Right Shoulder Exam     Range of Motion   Active abduction: 100   External rotation: 20   Forward flexion: 120   Internal rotation 0 degrees: Sacrum     Muscle Strength   Abduction: 4/5   Internal rotation: 5/5   External rotation: 5/5     Other   Sensation: normal  Pulse: present (2+ radial)            Physical Exam  Vitals reviewed  Constitutional:       Appearance: She is well-developed  HENT:      Head: Normocephalic and atraumatic  Eyes:      General:         Right eye: No discharge  Left eye: No discharge  Conjunctiva/sclera: Conjunctivae normal    Cardiovascular:      Rate and Rhythm: Regular rhythm  Heart sounds: Normal heart sounds  Pulmonary:      Effort: Pulmonary effort is normal  No respiratory distress  Breath sounds: Normal breath sounds  No stridor  Musculoskeletal:      Cervical back: Normal range of motion and neck supple  Skin:     General: Skin is warm and dry  Neurological:      Mental Status: She is alert and oriented to person, place, and time  Psychiatric:         Behavior: Behavior normal                This document was created using speech voice recognition software  Grammatical errors, random word insertions, pronoun errors, and incomplete sentences are an occasional consequence of this system due to software limitations, ambient noise, and hardware issues     Any formal questions or concerns about content, text, or information contained within the body of this dictation should be directly addressed to the provider for clarification

## 2023-03-16 ENCOUNTER — OFFICE VISIT (OUTPATIENT)
Dept: PHYSICAL THERAPY | Facility: CLINIC | Age: 48
End: 2023-03-16

## 2023-03-16 DIAGNOSIS — G89.29 CHRONIC RIGHT SHOULDER PAIN: ICD-10-CM

## 2023-03-16 DIAGNOSIS — M25.511 CHRONIC RIGHT SHOULDER PAIN: ICD-10-CM

## 2023-03-16 DIAGNOSIS — Z98.890 S/P RIGHT ROTATOR CUFF REPAIR: Primary | ICD-10-CM

## 2023-03-16 NOTE — PROGRESS NOTES
Daily Note     Today's date: 3/16/2023  Patient name: Ryan Leon  : 1975  MRN: [id number]  Referring provider: Maureen Rain*  Dx:   Encounter Diagnosis     ICD-10-CM    1  S/P right rotator cuff repair  Z98 890       2  Chronic right shoulder pain  M25 511     G89 29           Start Time: 0800  Stop Time: 0845  Total time in clinic (min): 45 minutes    Subjective: Patient reports she had f/u with MD and he would like her to have shoulder manipulation on 3/27/23  Objective: See treatment diary below      Assessment: Tolerated treatment well  Patient would benefit from continued PT  Pt reports discomfort during ROM exercises but able to push through  Continue to work on ROM and strength leading up to manipulation  Plan: Continue per plan of care  Precautions: S/P R RTC and biceps tenodesis 2022    BILL 1 UNIT TE ONLY (until )     Past Medical History:   Diagnosis Date   • Anesthesia complication     pt can still hear, describe whats going on in the room, and feel pressure last 2 surgeries  • Asthma    • Candidiasis of mouth     LA   2/26/15    R    16     • Exposure to viral disease     LA   16   R    3/24/16     • Otalgia 2012    LA   12    R   16         Insurance:  AMA/CMS Eval/ Re-eval POC expires Teresa Episcopalian #/ Referral # Total   Visits  Start date  Expiration date Extension  Visit limitation? PT only or  PT+OT?  Co-Insurance   [employer] 12/22 3/16  No auth required     [facility] PT $20 Co-pay                                                               Surgery date:     Date 2/23/23 2/28/23 3/2/23 3/6/23 3/16/23   Visit Number 19 20 21 22 23           Manual        Scap mobs         MI serratus ant and subscap        Gentle GH and AC joint mobility performed                       Neuro Re-ed        Scap retract  2 x 10 YTB        Shoulder isos        Serratus push up on wall  2x10 2x10 On table 2x10 On table 2*10    Wall slides with towel  2x10 2x10 2x10 2*10 2x10 pt overpressure                   Thera Ex        Pulleys  Flexion 15x  rectraction 15x  Flexion 15x  rectraction 15x  Flexion 15x  rectraction 15x  Flexion 15x  Retraction 15x Flexion 15x  Retraction 15x   Shoulder PROM Performed all motions        Pendulums         PASSIVE elbow flex/ext                  Posterior capsule stretch        Shoulder flex, abd, scaption x10 each        Cane ER Standing 15x Standing 15x Standing 15x Standing 15x  Standing 15x   TB shoulder ext  2x10 YTB  2x10 RTB 2*10 RTB  2*10 RTB row 2*10 GTB  2*10 GTB row   TB IR/ER walkouts  2x10 YTB  2x10 RTB 2*10 RTB 2x10 GTB   Doorway str     Uni 10s10x   Cane abd in standing 2x10 in standing 2x10 in standing 2x10  20x   IR BTB with SOS 2 x 10  2 x 10  2x10 2*10 20x   Cane ext  2 x 10  2 x 10  2x10 2*10 20x   ER is SL 2 x 10  2 x 10  2x10 2*10 20x   Prone ext 2 x 10  2 x 10  2x10 2*10 20x   Prone row 2 x 10  2 x 10  2x10 2*10 20x   Lat stretch single arm CC  5sx10 5sx10 5s x 10 10sx5   TRX pull up/push up  x10 each  10x 10x ea    Thera Activity        Patient education        Posture education                                                Modalities        Heat pre        Ice post      5 min

## 2023-03-16 NOTE — PRE-PROCEDURE INSTRUCTIONS
My Surgical Experience    The following information was developed to assist you to prepare for your operation  What do I need to do before coming to the hospital?  • Arrange for a responsible person to drive you to and from the hospital   • Arrange care for your children at home  Children are not allowed in the recovery areas of the hospital  • Plan to wear clothing that is easy to put on and take off  If you are having shoulder surgery, wear a shirt that buttons or zippers in the front  Bathing  o Shower the evening before and the morning of your surgery with an antibacterial soap  Please refer to the Pre Op Showering Instructions for Surgery Patients Sheet   o Remove nail polish and all body piercing jewelry  o Do not shave any body part for at least 24 hours before surgery-this includes face, arms, legs and upper body  Food  o Nothing to eat or drink after midnight the night before your surgery  This includes candy and chewing gum  o Exception: If your surgery is after 12:00pm (noon), you may have clear liquids such as 7-Up®, ginger ale, apple or cranberry juice, Jell-O®, water, or clear broth until 8:00 am  o Do not drink milk or juice with pulp on the morning before surgery  o Do not drink alcohol 24 hours before surgery  Medicine  o Follow instructions you received from your surgeon about which medicines you may take on the day of surgery  o If instructed to take medicine on the morning of surgery, take pills with just a small sip of water  Call your prescribing doctor for specific infroamtion on what to do if you take insulin    What should I bring to the hospital?    Bring:  • Crutches or a walker, if you have them, for foot or knee surgery  • A list of the daily medicines, vitamins, minerals, herbals and nutritional supplements you take   Include the dosages of medicines and the time you take them each day  • Glasses, dentures or hearing aids  • Minimal clothing; you will be wearing hospital sleepwear  • Photo ID; required to verify your identity  • If you have a Living Will or Power of , bring a copy of the documents  • If you have an ostomy, bring an extra pouch and any supplies you use    Do not bring  • Medicines or inhalers  • Money, valuables or jewelry    What other information should I know about the day of surgery? • Notify your surgeons if you develop a cold, sore throat, cough, fever, rash or any other illness  • Report to the Ambulatory Surgical/Same Day Surgery Unit  • You will be instructed to stop at Registration only if you have not been pre-registered  • Inform your  fi they do not stay that they will be asked by the staff to leave a phone number where they can be reached  • Be available to be reached before surgery  In the event the operating room schedule changes, you may be asked to come in earlier or later than expected    *It is important to tell your doctor and others involved in your health care if you are taking or have been taking any non-prescription drugs, vitamins, minerals, herbals or other nutritional supplements  Any of these may interact with some food or medicines and cause a reaction      Pre-Surgery Instructions:   Medication Instructions   • albuterol (PROVENTIL HFA,VENTOLIN HFA) 90 mcg/act inhaler Uses PRN- OK to take day of surgery   • Dulera 100-5 MCG/ACT inhaler Uses PRN- OK to take day of surgery   • EPINEPHrine (EPIPEN) 0 3 mg/0 3 mL SOAJ Hold day of surgery  • levocetirizine (XYZAL) 5 MG tablet Hold day of surgery     • ZOLMitriptan (ZOMIG) 5 MG tablet Uses PRN- OK to take day of surgery    Bring sling and wear a large top

## 2023-03-21 ENCOUNTER — OFFICE VISIT (OUTPATIENT)
Dept: PHYSICAL THERAPY | Facility: CLINIC | Age: 48
End: 2023-03-21

## 2023-03-21 DIAGNOSIS — G89.29 CHRONIC RIGHT SHOULDER PAIN: ICD-10-CM

## 2023-03-21 DIAGNOSIS — Z98.890 S/P RIGHT ROTATOR CUFF REPAIR: Primary | ICD-10-CM

## 2023-03-21 DIAGNOSIS — M25.511 CHRONIC RIGHT SHOULDER PAIN: ICD-10-CM

## 2023-03-21 NOTE — PROGRESS NOTES
Daily Note     Today's date: 3/21/2023  Patient name: Bradley Renner  : 1975  MRN: [id number]  Referring provider: Grazyna Spicer*  Dx:   Encounter Diagnosis     ICD-10-CM    1  S/P right rotator cuff repair  Z98 890       2  Chronic right shoulder pain  M25 511     G89 29           Start Time: 0930  Stop Time: 1015  Total time in clinic (min): 45 minutes    Subjective: Patient is having R RAHUL on Monday  She had soft wave therapy performed yesterday and notes significant improvements in the pain levels on the lateral aspect of her R UE  Objective: See treatment diary below      Assessment: Tolerated treatment well  Patient demonstrates improvements in both R shoulder ER and flexion today, with less pain at available end range  She continues to have increased pain throughout abduction ROM  Patient will perform HEP until RAHUL performed on Monday  Patient would benefit from continued PT    Plan: Continue per plan of care  Patient to be placed on hold until next week after RAHUL  Precautions: S/P R RTC and biceps tenodesis 2022    BILL 1 UNIT TE ONLY (until )     Past Medical History:   Diagnosis Date   • Anesthesia complication     pt can still hear, describe whats going on in the room, and feel pressure last 2 surgeries  • Asthma    • Candidiasis of mouth     LA   2/26/15    R    16     • Exposure to viral disease     LA   16   R    3/24/16     • Otalgia 2012    LA   12    R   16         Insurance:  AMA/CMS Eval/ Re-eval POC expires Gloria Dewitt #/ Referral # Total   Visits  Start date  Expiration date Extension  Visit limitation? PT only or  PT+OT?  Co-Insurance   [employer] 12/22 3/16  No auth required     [facility] PT $20 Co-pay                                                               Surgery date:     Date 2/23/23 2/28/23 3/2/23 3/6/23 3/16/23 3/21/23   Visit Number 19 20  24            Manual         Scap mobs          MI serratus ant and subscap         Gentle GH and AC joint mobility performed                          Neuro Re-ed         Scap retract  2 x 10 YTB         Shoulder isos         Serratus push up on wall  2x10 2x10 On table 2x10 On table 2*10     Wall slides with towel  2x10 2x10 2x10 2*10 2x10 pt overpressure 2x10 pt overpressure                     Thera Ex         Pulleys  Flexion 15x  rectraction 15x  Flexion 15x  rectraction 15x  Flexion 15x  rectraction 15x  Flexion 15x  Retraction 15x Flexion 15x  Retraction 15x Flexion 15x  Retraction 15x   Shoulder PROM Performed all motions         Pendulums          PASSIVE elbow flex/ext                    Posterior capsule stretch         Shoulder flex, abd, scaption x10 each         Cane ER Standing 15x Standing 15x Standing 15x Standing 15x  Standing 15x    TB shoulder ext  2x10 YTB  2x10 RTB 2*10 RTB  2*10 RTB row 2*10 GTB  2*10 GTB row 2*10 GTB  2*10 GTB row   TB IR/ER walkouts  2x10 YTB  2x10 RTB 2*10 RTB 2x10 GTB 2x10 GTB   Doorway str     Uni 10s10x    Cane abd in standing 2x10 in standing 2x10 in standing 2x10  20x 20x   IR BTB with SOS 2 x 10  2 x 10  2x10 2*10 20x 20x   Cane ext  2 x 10  2 x 10  2x10 2*10 20x 20x   ER is SL 2 x 10  2 x 10  2x10 2*10 20x 20x   Prone ext 2 x 10  2 x 10  2x10 2*10 20x 20x   Prone row 2 x 10  2 x 10  2x10 2*10 20x 20x   Lat stretch single arm CC  5sx10 5sx10 5s x 10 10sx5    TRX pull up/push up  x10 each  10x 10x ea     Thera Activity         Patient education         Posture education                                                      Modalities         Heat pre         Ice post      5 min 5 min

## 2023-03-21 NOTE — PROGRESS NOTES
Assessment/Plan   Problem List Items Addressed This Visit    None  Visit Diagnoses     Well woman exam    -  Primary    Routine screening for STI (sexually transmitted infection)        Relevant Orders    Chlamydia/GC amplified DNA by PCR    Trichomonas Vaginalis, ALEKSANDER          Discussion  I have discussed the importance of monthly self-breast exams, exercise and healthy diet  Encourage safe sexual practices; STI testing - desires testing    Contraception - none; desires Mirena IUD  Counseled patient on risks, benefits, procedure, and side effects  Message sent to clinical team to start prior auth  The current ASCCP guidelines were reviewed  Patient's last pap was 2021 and therefore, a pap with HPV cotesting is not indicated at this time  I emphasized the importance of an annual pelvic and breast exam      Mammogram is UTD  Colorectal screening is UTD  All questions have been answered to her satisfaction  RTO for APE or sooner if needed      Subjective     HPI   Jazzy Redding is a 49 y o  female who presents for annual well woman exam       LMP - 03/27/23; Periods are reg q 26-28 days and last 4 days; +heavy flow with changing super tampon every 1-2 hours  Cramps are excessive  No vulvar itch/burn; No vaginal itch/burn; No abn discharge or odor; No urinary sx - burning/pain/frequency/hematuria    (-) SBEs    No abd/pelvic pain or HAs; No menopausal symptoms: No hot flashes/night sweats, problems with intercourse, vaginal dryness; sleeping well;     Pt is sexually active with multiple partners  No issues with intercourse; Feels safe at home  Current contraception: none; she previously had the Mirena and desires another Mirena IUD  Condom use: yes    (+) PCP for routine Bw/care;     Last Pap - 2021 NILM/HPV-  History of abnormal Pap smear: yes; 2019, HPVHR+  Last mammo - 2022 BIRADS1  History of abnormal mammogram: no  Last colonoscopy - 2022; recommended 5yr f/u    Review of Systems Constitutional: Negative for fatigue  Eyes: Negative for photophobia and visual disturbance  Respiratory: Negative for cough and shortness of breath  Cardiovascular: Negative for chest pain and palpitations  Gastrointestinal: Negative for abdominal pain, blood in stool, constipation, diarrhea, nausea and rectal pain  Genitourinary: Negative for dyspareunia, dysuria, flank pain, frequency, genital sores, menstrual problem, pelvic pain, urgency, vaginal bleeding, vaginal discharge and vaginal pain  Musculoskeletal: Negative for arthralgias and back pain  Skin: Negative for rash  Neurological: Negative for weakness and headaches  The following portions of the patient's history were reviewed and updated as appropriate: allergies, current medications, past family history, past medical history, past social history, past surgical history and problem list          OB History        3    Para   2    Term   2            AB   1    Living   2       SAB   1    IAB        Ectopic        Multiple        Live Births   2                 Past Medical History:   Diagnosis Date   • Anesthesia complication     pt can still hear, describe whats going on in the room, and feel pressure last 2 surgeries  • Asthma    • Candidiasis of mouth     LA   2/26/15    R    16     • Exposure to viral disease     LA   16   R    3/24/16     • Otalgia 2012    LA   12    R   16         Past Surgical History:   Procedure Laterality Date   •  SECTION     • DILATION AND EVACUATION     • NASAL FRACTURE SURGERY     • OH MNPJ W/ANES SHOULDER JT APPL FIXATION APPARATUS Right 3/27/2023    Procedure: Right Shoulder Manipulation Under Anesthesia;   Surgeon: Nicole Contreras MD;  Location: [facility];  Service: Orthopedics   • OH SURGICAL ARTHROSCOPY SHOULDER W/ROTATOR CUFF RPR Right 2022    Procedure: Shoulder Arthroscopy with Rotator Cuff Repair, Biceps tenodesis, and subacromial decompression;  Surgeon: Harini Corral MD;  Location: [address] [place] [address];  Service: Orthopedics   • TONSILLECTOMY     • WRIST GANGLION EXCISION Left        Family History   Problem Relation Age of Onset   • Colon cancer Mother 61   • Hypertension Mother    • Depression Mother    • COPD Mother    • Cancer Mother    • Rheum arthritis Father    • Hyperlipidemia Father    • Depression Father    • Brain cancer Maternal Grandmother    • Diabetes type II Maternal Grandfather    • Diabetes Maternal Grandfather    • Lung cancer Paternal Uncle    • No Known Problems Maternal Uncle        Social History     Socioeconomic History   • Marital status:      Spouse name: Not on file   • Number of children: Not on file   • Years of education: Not on file   • Highest education level: Not on file   Occupational History   • Not on file   Tobacco Use   • Smoking status: Former     Packs/day:      Years:      Pack years:      Types: Cigarettes     Start date: 1987     Quit date: 1994     Years since quittin 2   • Smokeless tobacco: Never   Vaping Use   • Vaping Use: Never used   Substance and Sexual Activity   • Alcohol use:  Yes     Alcohol/week: 4 0 standard drinks     Types: 4 Glasses of wine per week     Comment: social    • Drug use: Yes     Types: Marijuana   • Sexual activity: Yes     Partners: Male     Birth control/protection: Male Sterilization   Other Topics Concern   • Not on file   Social History Narrative   • Not on file     Social Determinants of Health     Financial Resource Strain: Not on file   Food Insecurity: Not on file   Transportation Needs: Not on file   Physical Activity: Not on file   Stress: Not on file   Social Connections: Not on file   Intimate Partner Violence: Not on file   Housing Stability: Not on file         Current Outpatient Medications:   •  albuterol (PROVENTIL HFA,VENTOLIN HFA) 90 mcg/act inhaler, INHALE 2 PUFFS EVERY 6 HOURS AS NEEDED FOR "WHEEZING, Disp: 18 g, Rfl: 1  •  Dulera 100-5 MCG/ACT inhaler, INHALE 2 PUFFS 2 (TWO) TIMES A DAY RINSE MOUTH AFTER USE  (Patient taking differently: 2 (two) times a day as needed), Disp: 39 Inhaler, Rfl: 1  •  levocetirizine (XYZAL) 5 MG tablet, Take 1 tablet (5 mg total) by mouth daily, Disp: 90 tablet, Rfl: 1  •  oxyCODONE (Roxicodone) 5 immediate release tablet, Take 1 tablet (5 mg total) by mouth every 4 (four) hours as needed for moderate pain for up to 15 doses Max Daily Amount: 30 mg, Disp: 15 tablet, Rfl: 0  •  ZOLMitriptan (ZOMIG) 5 MG tablet, TAKE 1 TABLET BY MOUTH EVERY DAY AS NEEDED FOR MIGRAINE, Disp: 6 tablet, Rfl: 3  •  EPINEPHrine (EPIPEN) 0 3 mg/0 3 mL SOAJ, Inject 0 3 mL (0 3 mg total) into a muscle once for 1 dose, Disp: 0 6 mL, Rfl: 0    Allergies   Allergen Reactions   • Treenut [Nuts - Food Allergy] Anaphylaxis       Objective   Vitals:    03/31/23 1004   BP: 110/68   BP Location: Left arm   Patient Position: Sitting   Cuff Size: Standard   Weight: 69 7 kg (153 lb 9 6 oz)   Height: 5' 5\" (1 651 m)     Physical Exam  Vitals and nursing note reviewed  Constitutional:       Appearance: Normal appearance  She is well-developed and normal weight  HENT:      Head: Normocephalic and atraumatic  Eyes:      Conjunctiva/sclera: Conjunctivae normal    Cardiovascular:      Rate and Rhythm: Normal rate and regular rhythm  Heart sounds: Normal heart sounds  Pulmonary:      Effort: Pulmonary effort is normal       Breath sounds: Normal breath sounds  Chest:   Breasts:     Breasts are symmetrical       Right: Normal  No inverted nipple, mass, nipple discharge, skin change or tenderness  Left: Normal  No inverted nipple, mass, nipple discharge, skin change or tenderness  Abdominal:      General: Abdomen is flat  Bowel sounds are normal       Palpations: Abdomen is soft  Tenderness: There is no right CVA tenderness or left CVA tenderness  Genitourinary:     General: Normal vulva      " Exam position: Lithotomy position  Pubic Area: No rash or pubic lice  Labia:         Right: No rash  Left: No rash  Urethra: No urethral pain  Vagina: Normal  No vaginal discharge  Cervix: Normal       Uterus: Normal  No uterine prolapse  Adnexa: Right adnexa normal and left adnexa normal         Right: No mass or tenderness  Left: No mass or tenderness  Rectum: Normal  Guaiac result negative  No tenderness or external hemorrhoid  Normal anal tone  Musculoskeletal:         General: Normal range of motion  Cervical back: Normal range of motion  Right lower leg: No edema  Left lower leg: No edema  Lymphadenopathy:      Upper Body:      Right upper body: No supraclavicular or axillary adenopathy  Left upper body: No supraclavicular or axillary adenopathy  Lower Body: No right inguinal adenopathy  No left inguinal adenopathy  Skin:     General: Skin is warm and dry  Neurological:      Mental Status: She is alert and oriented to person, place, and time  Psychiatric:         Mood and Affect: Mood normal          Behavior: Behavior normal          Thought Content: Thought content normal          Judgment: Judgment normal          There are no Patient Instructions on file for this visit

## 2023-03-23 ENCOUNTER — APPOINTMENT (OUTPATIENT)
Dept: PHYSICAL THERAPY | Facility: CLINIC | Age: 48
End: 2023-03-23

## 2023-03-26 ENCOUNTER — ANESTHESIA EVENT (OUTPATIENT)
Dept: PERIOP | Facility: HOSPITAL | Age: 48
End: 2023-03-26

## 2023-03-27 ENCOUNTER — HOSPITAL ENCOUNTER (OUTPATIENT)
Facility: HOSPITAL | Age: 48
Setting detail: OUTPATIENT SURGERY
Discharge: HOME | End: 2023-03-27
Attending: ORTHOPAEDIC SURGERY | Admitting: ORTHOPAEDIC SURGERY

## 2023-03-27 ENCOUNTER — ANESTHESIA (OUTPATIENT)
Dept: PERIOP | Facility: HOSPITAL | Age: 48
End: 2023-03-27

## 2023-03-27 VITALS
BODY MASS INDEX: 25.86 KG/M2 | SYSTOLIC BLOOD PRESSURE: 140 MMHG | DIASTOLIC BLOOD PRESSURE: 76 MMHG | WEIGHT: 155.4 LBS | TEMPERATURE: 98 F | HEART RATE: 52 BPM | RESPIRATION RATE: 18 BRPM | OXYGEN SATURATION: 100 %

## 2023-03-27 DIAGNOSIS — M75.01 ADHESIVE CAPSULITIS OF RIGHT SHOULDER: Primary | ICD-10-CM

## 2023-03-27 LAB
EXT PREGNANCY TEST URINE: NEGATIVE
EXT. CONTROL: NORMAL

## 2023-03-27 RX ORDER — SODIUM CHLORIDE, SODIUM LACTATE, POTASSIUM CHLORIDE, CALCIUM CHLORIDE 600; 310; 30; 20 MG/100ML; MG/100ML; MG/100ML; MG/100ML
100 INJECTION, SOLUTION INTRAVENOUS CONTINUOUS
Status: DISCONTINUED | OUTPATIENT
Start: 2023-03-27 | End: 2023-03-27 | Stop reason: HOSPADM

## 2023-03-27 RX ORDER — SODIUM CHLORIDE, SODIUM LACTATE, POTASSIUM CHLORIDE, CALCIUM CHLORIDE 600; 310; 30; 20 MG/100ML; MG/100ML; MG/100ML; MG/100ML
125 INJECTION, SOLUTION INTRAVENOUS CONTINUOUS
Status: DISCONTINUED | OUTPATIENT
Start: 2023-03-27 | End: 2023-03-27 | Stop reason: HOSPADM

## 2023-03-27 RX ORDER — LIDOCAINE HYDROCHLORIDE 10 MG/ML
INJECTION, SOLUTION EPIDURAL; INFILTRATION; INTRACAUDAL; PERINEURAL AS NEEDED
Status: DISCONTINUED | OUTPATIENT
Start: 2023-03-27 | End: 2023-03-27

## 2023-03-27 RX ORDER — ONDANSETRON 2 MG/ML
4 INJECTION INTRAMUSCULAR; INTRAVENOUS ONCE AS NEEDED
Status: DISCONTINUED | OUTPATIENT
Start: 2023-03-27 | End: 2023-03-27 | Stop reason: HOSPADM

## 2023-03-27 RX ORDER — LIDOCAINE HYDROCHLORIDE 10 MG/ML
INJECTION, SOLUTION EPIDURAL; INFILTRATION; INTRACAUDAL; PERINEURAL
Status: COMPLETED | OUTPATIENT
Start: 2023-03-27 | End: 2023-03-27

## 2023-03-27 RX ORDER — GLYCOPYRROLATE 0.2 MG/ML
INJECTION INTRAMUSCULAR; INTRAVENOUS AS NEEDED
Status: DISCONTINUED | OUTPATIENT
Start: 2023-03-27 | End: 2023-03-27

## 2023-03-27 RX ORDER — FENTANYL CITRATE 50 UG/ML
INJECTION, SOLUTION INTRAMUSCULAR; INTRAVENOUS AS NEEDED
Status: DISCONTINUED | OUTPATIENT
Start: 2023-03-27 | End: 2023-03-27

## 2023-03-27 RX ORDER — MIDAZOLAM HYDROCHLORIDE 2 MG/2ML
INJECTION, SOLUTION INTRAMUSCULAR; INTRAVENOUS
Status: COMPLETED | OUTPATIENT
Start: 2023-03-27 | End: 2023-03-27

## 2023-03-27 RX ORDER — MIDAZOLAM HYDROCHLORIDE 2 MG/2ML
INJECTION, SOLUTION INTRAMUSCULAR; INTRAVENOUS AS NEEDED
Status: DISCONTINUED | OUTPATIENT
Start: 2023-03-27 | End: 2023-03-27

## 2023-03-27 RX ORDER — OXYCODONE HYDROCHLORIDE 5 MG/1
5 TABLET ORAL EVERY 4 HOURS PRN
Qty: 15 TABLET | Refills: 0 | Status: SHIPPED | OUTPATIENT
Start: 2023-03-27 | End: 2023-04-05

## 2023-03-27 RX ORDER — PROPOFOL 10 MG/ML
INJECTION, EMULSION INTRAVENOUS AS NEEDED
Status: DISCONTINUED | OUTPATIENT
Start: 2023-03-27 | End: 2023-03-27

## 2023-03-27 RX ORDER — FENTANYL CITRATE/PF 50 MCG/ML
50 SYRINGE (ML) INJECTION
Status: DISCONTINUED | OUTPATIENT
Start: 2023-03-27 | End: 2023-03-27 | Stop reason: HOSPADM

## 2023-03-27 RX ORDER — BUPIVACAINE HYDROCHLORIDE 5 MG/ML
INJECTION, SOLUTION PERINEURAL
Status: COMPLETED | OUTPATIENT
Start: 2023-03-27 | End: 2023-03-27

## 2023-03-27 RX ADMIN — GLYCOPYRROLATE 0.2 MCG: 0.2 INJECTION, SOLUTION INTRAMUSCULAR; INTRAVENOUS at 07:56

## 2023-03-27 RX ADMIN — BUPIVACAINE 10 ML: 13.3 INJECTION, SUSPENSION, LIPOSOMAL INFILTRATION at 07:40

## 2023-03-27 RX ADMIN — LIDOCAINE HYDROCHLORIDE 20 MG: 10 INJECTION, SOLUTION EPIDURAL; INFILTRATION; INTRACAUDAL; PERINEURAL at 07:55

## 2023-03-27 RX ADMIN — PROPOFOL 50 MG: 10 INJECTION, EMULSION INTRAVENOUS at 07:55

## 2023-03-27 RX ADMIN — MIDAZOLAM 2 MG: 1 INJECTION INTRAMUSCULAR; INTRAVENOUS at 07:38

## 2023-03-27 RX ADMIN — PROPOFOL 30 MG: 10 INJECTION, EMULSION INTRAVENOUS at 07:56

## 2023-03-27 RX ADMIN — LIDOCAINE HYDROCHLORIDE 1 ML: 10 INJECTION, SOLUTION EPIDURAL; INFILTRATION; INTRACAUDAL; PERINEURAL at 07:40

## 2023-03-27 RX ADMIN — SODIUM CHLORIDE, SODIUM LACTATE, POTASSIUM CHLORIDE, AND CALCIUM CHLORIDE: .6; .31; .03; .02 INJECTION, SOLUTION INTRAVENOUS at 07:53

## 2023-03-27 RX ADMIN — BUPIVACAINE HYDROCHLORIDE 10 ML: 5 INJECTION, SOLUTION PERINEURAL at 07:40

## 2023-03-27 NOTE — PERIOPERATIVE NURSING NOTE
Pt d/c to home at this time w/spouse,  Via w/c  Pt left with all belongings  Iv was D/C intact with dry sterile dressing, Encouraged to keep follow up appointments, Verbalized understanding  D/C instructions reviewed and explained with patient and family member  Verbalized understanding  New Rx explained, Verbalized understanding

## 2023-03-27 NOTE — PERIOPERATIVE NURSING NOTE
Received patient from PACU in room 10, patient is alert and awake, VSS, denies pain, no distress noted, right radial and brachial pulses palpable  Patient is tolerating PO fluids, call bell placed in reach,  is at bedside,  will continue to monitor patient until d/c criteria met

## 2023-03-27 NOTE — ANESTHESIA POSTPROCEDURE EVALUATION
Post-Op Assessment Note    CV Status:  Stable  Pain Score: 0    Pain management: adequate     Mental Status:  Alert and awake   Hydration Status:  Euvolemic   PONV Controlled:  Controlled   Airway Patency:  Patent      Post Op Vitals Reviewed: Yes      Staff: Anesthesiologist, CRNA         No notable events documented      BP   98/53   Temp      Pulse 46   Resp   14   SpO2   98

## 2023-03-27 NOTE — ANESTHESIA PREPROCEDURE EVALUATION
Procedure:  Right Shoulder Manipulation Under Anesthesia (Right: Shoulder)    Relevant Problems   ANESTHESIA (within normal limits)      CARDIO (within normal limits)   (+) Common migraine without aura      NEURO/PSYCH   (+) Common migraine without aura      PULMONARY   (+) Asthma, mild persistent        Physical Exam    Airway    Mallampati score: II  TM Distance: >3 FB  Neck ROM: full     Dental   No notable dental hx     Cardiovascular  Rhythm: regular, Rate: normal,     Pulmonary  Breath sounds clear to auscultation,     Other Findings        Anesthesia Plan  ASA Score- 2     Anesthesia Type- regional with ASA Monitors  Additional Monitors:   Airway Plan: LMA  Plan Factors-Exercise tolerance (METS): >4 METS  Chart reviewed  EKG reviewed  Existing labs reviewed  Patient summary reviewed  Patient is not a current smoker  Induction- intravenous  Postoperative Plan- Plan for postoperative opioid use  Planned trial extubation    Informed Consent- Anesthetic plan and risks discussed with patient  I personally reviewed this patient with the CRNA  Discussed and agreed on the Anesthesia Plan with the CRNA  Shaina Montero

## 2023-03-27 NOTE — INTERVAL H&P NOTE
H&P reviewed  After examining the patient I find no changes in the patients condition since the H&P had been written      Vitals:    03/27/23 0632   BP: 139/65   Pulse: (!) 44   Resp: 18   Temp: 98 °F (36 7 °C)   SpO2: 100%

## 2023-03-27 NOTE — OR NURSING
Time out conducted at 753  Procedured done in PACU  Procedure start at 756  Manipulation of Right Shoulder  Procedure end at 758  Ansesthesia present during procedure and at handoff  Splint applied to Right shoulder    Cherry Severe RN

## 2023-03-27 NOTE — OP NOTE
OPERATIVE REPORT  PATIENT NAME: Elva Copeland    :  1975  MRN: [id number]  Pt Location: [facility] OR ROOM 03    SURGERY DATE: 3/27/2023    Surgeon(s) and Role:     * Pablito Gonzalez MD - Primary    Preop Diagnosis:  S/P right rotator cuff repair [Z98 890]  Adhesive capsulitis of right shoulder [M75 01]    Post-Op Diagnosis Codes:     * S/P right rotator cuff repair [Z98 890]     * Adhesive capsulitis of right shoulder [M75 01]    Procedure(s):  Right - Right Shoulder Manipulation Under Anesthesia    Specimen(s):  * No specimens in log *    Estimated Blood Loss:   Minimal    Drains:  * No LDAs found *    Anesthesia Type:   Regional with Sedation    Operative Indications:  S/P right rotator cuff repair [Z98 890]  Adhesive capsulitis of right shoulder Danielle Morelos is a 58-year-old female who underwent right shoulder rotator cuff repair performed by me  She unfortunately has developed adhesive capsulitis about the right shoulder  This is despite significant physical therapy  She understood the risks and benefits of right shoulder manipulation under anesthesia and wished to go ahead  The risks are inclusive of but not limited to recurrence or persistence of stiffness, failure to alleviate discomfort, failure to regain full strength and ability, fracture, nerve or blood vessel injury causing numbness pain and weakness, and need for further surgery  Operative Findings:  Right shoulder examination under anesthesia demonstrated forward flexion to 90 degrees abduction to 80 degrees external rotation to 10 degrees internal rotation is 0 degrees  After gentle manipulation, we achieved forward flexion of 170 degrees abduction to 160 degrees external rotation to 90 degrees and internal rotation to 80 degrees  Complications:   None    Procedure and Technique:  Ashlyn Reeves was taken to the postanesthesia care unit and was supine on the hospital stretcher    She had regional anesthesia performed by attending anesthesiologist   Sedation was administered after surgical timeout  Examination under anesthesia was then performed as described above  We then performed a gentle manipulation under anesthesia achieving excellent release of the adhesions  We were then able to achieve very nice range of motion as listed above  She was then placed into a sling for just the amount of time while the nerve block is dense  She tolerated procedure well  She will follow-up in 2 weeks for repeat clinical evaluation  She will begin intensive physical therapy tomorrow for range of motion     I was present for the entire procedure and A qualified resident physician was not available    Patient Disposition:  PACU         SIGNATURE: Filomena Helton MD  DATE: March 27, 2023  TIME: 7:58 AM

## 2023-03-27 NOTE — ANESTHESIA PROCEDURE NOTES
Peripheral Block    Patient location during procedure: holding area  Start time: 3/27/2023 7:40 AM  Reason for block: at surgeon's request and post-op pain management  Staffing  Performed: Anesthesiologist   Preanesthetic Checklist  Completed: patient identified, IV checked, site marked, risks and benefits discussed, surgical consent, monitors and equipment checked, pre-op evaluation and timeout performed  Peripheral Block  Patient position: supine  Prep: ChloraPrep  Patient monitoring: continuous pulse ox and frequent blood pressure checks  Block type: interscalene  Laterality: right  Procedures: ultrasound guided, Ultrasound guidance required for the procedure to increase accuracy and safety of medication placement and decrease risk of complications  Ultrasound permanent image savedbupivacaine (MARCAINE) 0 5 % - Perineural   10 mL - 3/27/2023 7:40:00 AM  lidocaine (PF) (XYLOCAINE-MPF) 1 % - Infiltration   1 mL - 3/27/2023 7:40:00 AM  midazolam (VERSED) 2 mg/2 mL - Intravenous   2 mg - 3/27/2023 7:38:00 AM  Needle  Needle type: Stimuplex   Needle gauge: 20 G  Needle length: 4 in  Needle localization: ultrasound guidance  Needle insertion depth: 4 cm  Test dose: negative  Assessment  Injection assessment: incremental injection, local visualized surrounding nerve on ultrasound, negative aspiration for CSF, negative aspiration for heme and no paresthesia on injection  Paresthesia pain: none  Heart rate change: no  Slow fractionated injection: yes  Post-procedure:  site cleaned  patient tolerated the procedure well with no immediate complications  Additional Notes  0 5% Bupivacaine 10ml was mixed with Exparel 10ml for the block

## 2023-03-28 ENCOUNTER — EVALUATION (OUTPATIENT)
Dept: PHYSICAL THERAPY | Facility: CLINIC | Age: 48
End: 2023-03-28

## 2023-03-28 DIAGNOSIS — Z98.890 S/P RIGHT ROTATOR CUFF REPAIR: Primary | ICD-10-CM

## 2023-03-28 DIAGNOSIS — M25.511 CHRONIC RIGHT SHOULDER PAIN: ICD-10-CM

## 2023-03-28 DIAGNOSIS — G89.29 CHRONIC RIGHT SHOULDER PAIN: ICD-10-CM

## 2023-03-28 NOTE — PROGRESS NOTES
PT Re-Evaluation     Today's date: 3/28/2023  Patient name: Cirilo Andrews  : 1975  MRN: [id number]  Referring provider: Maggie Gutierrez*  Dx:   Encounter Diagnosis     ICD-10-CM    1  S/P right rotator cuff repair  Z98 890       2  Chronic right shoulder pain  M25 511     G89 29             Assessment  Assessment details: Patient is a 49 y o  Female who presents to skilled outpatient PT with referring diagnosis of s/p right rotator cuff repair on 2022 and s/p R RAHUL on 3/27/23  She demonstrates excellent ROM of her R shoulder today despite inability to perform any active movement at this time  Heavy stress on HEP to help maintain ROM gained during surgery  Patient and patient's boyfriend verbalized understanding       Impairments: Abnormal or restricted ROM, Activity intolerance, hypomobility of R shoulder, decreased strength   Understanding of Dx/Px/POC: Excellent  Prognosis: Excellent      Plan  Patient would benefit from: PT Eval and Skilled PT  Planned modality interventions: Biofeedback, Cryotherapy, TENS, Thermotherapy: Hydrocollator Packs and Traction  Planned therapy interventions: ADL training, Coordination, Functional ROM exercises, HEP, Joint mobilization, Manual therapy, Farias taping, Neuromuscular re-education, Patient education, Postural training, Strengthening, Stretching, Therapeutic activities, Therapeutic exercises, Therapeutic training, Activity modification and Work reintegration  Frequency: 5x/week for 2 weeks, 3x/week for 2 weeks  Duration in weeks: 8  Plan of Care beginning date: 3/28/23  Plan of Care expiration date: 8 weeks - 23  Treatment plan discussed with: Patient       Goals  Short Term Goals (2 weeks from 3/28/23):    - Patient will be independent in basic HEP 2-3 weeks  - Patient will have 0/10 pain at rest  - Patient will demonstrate >1/3 improvement in MMT grade as applicable  - Patient functional goal: Patient will perform self care activities "independently  Long Term Goals (4 weeks from 3/28/23):   - Patient will be independent in a comprehensive home exercise program  - Patient FOTO score will improve to 72/100  - Patient will self-report >75% improvement in function  - Patient functional goal: Patient will demonstrate full R shoulder ROM with no pain  Long Term Goals (8 weeks 3/28/23):   - Patient will return to work with no restrictions  Subjective    History of Present Illness  - Mechanism of injury: Patient reports s/p R RTC on 2022  She tore her RTC back in January when she was working out  She is currently wearing her sling with abduction pillow  At times, she notes a slight \"pop\" on the anterior aspect of her R shoulder  She takes Motrin at night  Patient works at [employer]  She is currently out of work  She would like to return to her workouts  - Updated subjective as of : Patient notes 40% improvement in function since starting PT intervention  She continues to note increased pain when she is attempting to don clothing, paint, and lift objects of weight  She has yet to return to work due to these impairments  - Updated subjective as of 3/28/23: Patient reports s/p R RAHUL on 3/27/23  She reports her nerve block is still present at this time      - Functional limitations: lifting, reaching, painting, donning clothing, performing self care    - Patient goals: \"Be able to go back to work and paint  \"      Pain  - Current pain ratin/10  - At best pain ratin/10  - At worst pain ratin/10  - Location: R shoulder   - Alleviating factors: ice         Objective   UE MMT    L Shoulder Flexion: 5/5 R Shoulder Flexion: 2/5   L Shoulder Extension: 5/5 R Shoulder Extension: 2/5   L Shoulder Abduction: 5/5 R Shoulder Abduction: 2/5   L Shoulder  IR: 5/5 R Shoulder  IR: 2/5   L Shoulder  ER: 5/5 R Shoulder  ER: 2/5   L Elbow Extension: 5/5 R Elbow Extension: 5/5   L Elbow Flexion: 5/5 R Elbow Flexion: 3/5   L Wrist Flexion: " 5/5 R Wrist Flexion: 5/5   L Wrist Extension: 5/5 R Wrist Extension: 5/5         Sensation  - Light touch: intact     Palpation   -R biceps tendon, R supraspinatus      Shoulder AROM L R (NT due to surgical intervention)   Flexion 180 deg    Extension 60 deg    ER 90 deg    IR 90 deg    Abduction 180 deg    Functional IR BTB T6    Functional ER BTH T1      Shoulder PROM L R   Flexion 180 deg 180 deg   Extension 60 deg 40 deg   ER 90 deg 90 deg   IR 90 deg 90 deg   Abduction 180 deg 180 deg              Precautions: S/P R RTC and biceps tenodesis 12/8/2022    Past Medical History:   Diagnosis Date   • Anesthesia complication     pt can still hear, describe whats going on in the room, and feel pressure last 2 surgeries  • Asthma    • Candidiasis of mouth     LA   2/26/15    R    2/25/16     • Exposure to viral disease     LA   2/25/16   R    3/24/16     • Otalgia 01/23/2012    LA   1/23/12    R   2/23/16         Insurance:  AMA/CMS Eval/ Re-eval POC expires Danish Odell #/ Referral # Total   Visits  Start date  Expiration date Extension  Visit limitation? PT only or  PT+OT?  Co-Insurance   [employer] 12/22 3/16  No auth required  12/22   [facility] PT $20 Co-pay                                                               Surgery date:     Date 3/28/23      Visit Number              Manual       Scap mobs       Gentle GH and AC joint mobility                     Neuro Re-ed       Scap retract  10                           Thera Ex       Pulleys  Flex, abd, IR BTB x5' total       Shoulder PROM Performed x10'      Pendulums  30 each       Elbow flex/ext 10              Cane abd in standing 10      IR BTB with SOS 3sx20      Cane ER 20      Cane ext  20                                                                            Thera Activity       Patient education       Posture education       Re-evaluation x5'                                   Modalities       Heat pre x5'      Ice post            Access Code: [email or web address]  URL: [email or web address]  Date: 03/28/2023  Prepared by: Kaiden Dapper    Exercises  - Circular Shoulder Pendulum with Table Support  - 1 x daily - 7 x weekly - 3 sets - 10 reps  - Horizontal Shoulder Pendulum with Table Support  - 1 x daily - 7 x weekly - 3 sets - 10 reps  - Flexion-Extension Shoulder Pendulum with Table Support  - 1 x daily - 7 x weekly - 3 sets - 10 reps  - Supine Shoulder Flexion AAROM with Hands Clasped  - 1 x daily - 7 x weekly - 3 sets - 10 reps  - Supine Shoulder External Rotation in 45 Degrees Abduction AAROM with Dowel  - 1 x daily - 7 x weekly - 3 sets - 10 reps  - Seated Scapular Retraction  - 1 x daily - 7 x weekly - 3 sets - 10 reps  - Standing Shoulder Internal Rotation Stretch with Towel  - 1 x daily - 7 x weekly - 3 sets - 10 reps - 3 hold  - Seated Shoulder Flexion AAROM with Pulley Behind  - 1 x daily - 7 x weekly - 3 sets - 10 reps  - Seated Shoulder Abduction AAROM with Pulley Behind  - 1 x daily - 7 x weekly - 3 sets - 10 reps  - Standing Shoulder Internal Rotation AAROM with Pulley  - 1 x daily - 7 x weekly - 3 sets - 10 reps

## 2023-03-29 ENCOUNTER — OFFICE VISIT (OUTPATIENT)
Dept: PHYSICAL THERAPY | Facility: CLINIC | Age: 48
End: 2023-03-29

## 2023-03-29 DIAGNOSIS — Z98.890 S/P RIGHT ROTATOR CUFF REPAIR: Primary | ICD-10-CM

## 2023-03-29 DIAGNOSIS — G89.29 CHRONIC RIGHT SHOULDER PAIN: ICD-10-CM

## 2023-03-29 DIAGNOSIS — M25.511 CHRONIC RIGHT SHOULDER PAIN: ICD-10-CM

## 2023-03-29 NOTE — PROGRESS NOTES
Daily Note     Today's date: 3/29/2023  Patient name: Wilian Dill  : 1975  MRN: [id number]  Referring provider: Isidro Quiles*  Dx:   Encounter Diagnosis     ICD-10-CM    1  S/P right rotator cuff repair  Z98 890       2  Chronic right shoulder pain  M25 511     G89 29           Start Time: 0800  Stop Time: 0845  Total time in clinic (min): 45 minutes    Subjective: Patient reports increased stiffness of her R shoulder this morning  She was able to unhook her bra independently yesterday  Objective: See treatment diary below      Assessment: Tolerated treatment well  Patient demonstrates excellent ROM of her R shoulder today  She notes increased pain and stiffness of her R UE at available end range flexion and ER  Encouraged ROM activities at home to maintain ROM obtained  Patient would benefit from continued PT      Plan: Continue per plan of care  Precautions: S/P R RTC and biceps tenodesis 2022, S/P R RAHUL 3/27/23    Past Medical History:   Diagnosis Date   • Anesthesia complication     pt can still hear, describe whats going on in the room, and feel pressure last 2 surgeries  • Asthma    • Candidiasis of mouth     LA   2/26/15    R    16     • Exposure to viral disease     LA   16   R    3/24/16     • Otalgia 2012    LA   12    R   16         Insurance:  AMA/CMS Eval/ Re-eval POC expires nAdrew Magaña #/ Referral # Total   Visits  Start date  Expiration date Extension  Visit limitation? PT only or  PT+OT?  Co-Insurance   [employer] 12/22 3/16  No auth required     [facility] PT $20 Co-pay                                                               Surgery date:     Date 3/28/23 3/29/23     Visit Number              Manual       Scap mobs       Gentle GH and AC joint mobility                     Neuro Re-ed       Scap retract  10 20                          Thera Ex       Pulleys  Flex, abd, IR BTB x5' total  Flex, abd, IR BTB x5' total      Shoulder PROM Performed x10' Performed x10'     Pendulums  30 each  30 each      Elbow flex/ext 10 20             Cane abd in standing 10 20     IR BTB with SOS 3sx20 3sx20     Cane ER 20 20     Cane ext  20 20                                                                           Thera Activity       Patient education       Posture education       Re-evaluation x5'                                   Modalities       Heat pre x5' x5'     Ice post            Access Code: [email or web address]  URL: [email or web address]  Date: 03/28/2023  Prepared by: Mai Stroud    Exercises  - Circular Shoulder Pendulum with Table Support  - 1 x daily - 7 x weekly - 3 sets - 10 reps  - Horizontal Shoulder Pendulum with Table Support  - 1 x daily - 7 x weekly - 3 sets - 10 reps  - Flexion-Extension Shoulder Pendulum with Table Support  - 1 x daily - 7 x weekly - 3 sets - 10 reps  - Supine Shoulder Flexion AAROM with Hands Clasped  - 1 x daily - 7 x weekly - 3 sets - 10 reps  - Supine Shoulder External Rotation in 45 Degrees Abduction AAROM with Dowel  - 1 x daily - 7 x weekly - 3 sets - 10 reps  - Seated Scapular Retraction  - 1 x daily - 7 x weekly - 3 sets - 10 reps  - Standing Shoulder Internal Rotation Stretch with Towel  - 1 x daily - 7 x weekly - 3 sets - 10 reps - 3 hold  - Seated Shoulder Flexion AAROM with Pulley Behind  - 1 x daily - 7 x weekly - 3 sets - 10 reps  - Seated Shoulder Abduction AAROM with Pulley Behind  - 1 x daily - 7 x weekly - 3 sets - 10 reps  - Standing Shoulder Internal Rotation AAROM with Pulley  - 1 x daily - 7 x weekly - 3 sets - 10 reps

## 2023-03-30 ENCOUNTER — OFFICE VISIT (OUTPATIENT)
Dept: PHYSICAL THERAPY | Facility: CLINIC | Age: 48
End: 2023-03-30

## 2023-03-30 DIAGNOSIS — Z98.890 S/P RIGHT ROTATOR CUFF REPAIR: Primary | ICD-10-CM

## 2023-03-30 DIAGNOSIS — G89.29 CHRONIC RIGHT SHOULDER PAIN: ICD-10-CM

## 2023-03-30 DIAGNOSIS — M25.511 CHRONIC RIGHT SHOULDER PAIN: ICD-10-CM

## 2023-03-30 NOTE — PROGRESS NOTES
Daily Note     Today's date: 3/30/2023  Patient name: Wilian Dill  : 1975  MRN: [id number]  Referring provider: Isidro Quiles*  Dx:   Encounter Diagnosis     ICD-10-CM    1  S/P right rotator cuff repair  Z98 890       2  Chronic right shoulder pain  M25 511     G89 29           Start Time: 0800  Stop Time: 0845  Total time in clinic (min): 45 minutes    Subjective: Patient reports taking her pain medication prior to today's session  Objective: See treatment diary below      Assessment: Tolerated treatment well  Patient demonstrates excellent ROM of her R shoulder today, despite having increased pain at available end range  Will continue to progress as able  Patient would benefit from continued PT      Plan: Continue per plan of care  Precautions: S/P R RTC and biceps tenodesis 2022, S/P R RAHUL 3/27/23    Past Medical History:   Diagnosis Date   • Anesthesia complication     pt can still hear, describe whats going on in the room, and feel pressure last 2 surgeries  • Asthma    • Candidiasis of mouth     LA   2/26/15    R    16     • Exposure to viral disease     LA   16   R    3/24/16     • Otalgia 2012    LA   12    R   16         Insurance:  AMA/CMS Eval/ Re-eval POC expires Andrew Magaña #/ Referral # Total   Visits  Start date  Expiration date Extension  Visit limitation? PT only or  PT+OT?  Co-Insurance   [employer] 12/22 3/16  No auth required     [facility] PT $20 Co-pay                                                               Surgery date:     Date 3/28/23 3/29/23 3/30/23    Visit Number              Manual       Scap mobs       Gentle GH and AC joint mobility                     Neuro Re-ed       Scap retract  10 20 20                         Thera Ex       Pulleys  Flex, abd, IR BTB x5' total  Flex, abd, IR BTB x5' total  Flex, abd, IR BTB x5' total     Shoulder PROM Performed x10' Performed x10' Performed x10'    Pendulums  30 each  30 each  30 each     Elbow flex/ext 10 20 20            Cane abd in standing 10 20 20    IR BTB with SOS 3sx20 3sx20 3sx20    Cane ER 20 20 20    Cane ext  20 20 20                                                                          Thera Activity       Patient education       Posture education       Re-evaluation x5'                                   Modalities       Heat pre x5' x5' x5'    Ice post    x5'        Access Code: [email or web address]  URL: [email or web address]  Date: 03/28/2023  Prepared by: Yovani Mina    Exercises  - Circular Shoulder Pendulum with Table Support  - 1 x daily - 7 x weekly - 3 sets - 10 reps  - Horizontal Shoulder Pendulum with Table Support  - 1 x daily - 7 x weekly - 3 sets - 10 reps  - Flexion-Extension Shoulder Pendulum with Table Support  - 1 x daily - 7 x weekly - 3 sets - 10 reps  - Supine Shoulder Flexion AAROM with Hands Clasped  - 1 x daily - 7 x weekly - 3 sets - 10 reps  - Supine Shoulder External Rotation in 45 Degrees Abduction AAROM with Dowel  - 1 x daily - 7 x weekly - 3 sets - 10 reps  - Seated Scapular Retraction  - 1 x daily - 7 x weekly - 3 sets - 10 reps  - Standing Shoulder Internal Rotation Stretch with Towel  - 1 x daily - 7 x weekly - 3 sets - 10 reps - 3 hold  - Seated Shoulder Flexion AAROM with Pulley Behind  - 1 x daily - 7 x weekly - 3 sets - 10 reps  - Seated Shoulder Abduction AAROM with Pulley Behind  - 1 x daily - 7 x weekly - 3 sets - 10 reps  - Standing Shoulder Internal Rotation AAROM with Pulley  - 1 x daily - 7 x weekly - 3 sets - 10 reps

## 2023-03-31 ENCOUNTER — OFFICE VISIT (OUTPATIENT)
Dept: PHYSICAL THERAPY | Facility: CLINIC | Age: 48
End: 2023-03-31

## 2023-03-31 ENCOUNTER — ANNUAL EXAM (OUTPATIENT)
Dept: OBGYN CLINIC | Facility: CLINIC | Age: 48
End: 2023-03-31

## 2023-03-31 VITALS
SYSTOLIC BLOOD PRESSURE: 110 MMHG | WEIGHT: 153.6 LBS | HEIGHT: 65 IN | DIASTOLIC BLOOD PRESSURE: 68 MMHG | BODY MASS INDEX: 25.59 KG/M2

## 2023-03-31 DIAGNOSIS — M25.511 CHRONIC RIGHT SHOULDER PAIN: ICD-10-CM

## 2023-03-31 DIAGNOSIS — Z98.890 S/P RIGHT ROTATOR CUFF REPAIR: Primary | ICD-10-CM

## 2023-03-31 DIAGNOSIS — G89.29 CHRONIC RIGHT SHOULDER PAIN: ICD-10-CM

## 2023-03-31 DIAGNOSIS — Z11.3 ROUTINE SCREENING FOR STI (SEXUALLY TRANSMITTED INFECTION): ICD-10-CM

## 2023-03-31 DIAGNOSIS — Z01.419 WELL WOMAN EXAM: Primary | ICD-10-CM

## 2023-03-31 NOTE — PROGRESS NOTES
Daily Note     Today's date: 3/31/2023  Patient name: Sophia Albarado  : 1975  MRN: [id number]  Referring provider: Aki Shepherd*  Dx:   Encounter Diagnosis     ICD-10-CM    1  S/P right rotator cuff repair  Z98 890       2  Chronic right shoulder pain  M25 511     G89 29           Start Time: 0800  Stop Time: 0845  Total time in clinic (min): 45 minutes    Subjective: Patient reports her shoulder is better than yesterday  Objective: See treatment diary below      Assessment: Tolerated treatment well  Patient demonstrates improvements in IR behind her back today, with increased pain noted at available end range  Will progress to more strengthening activities next week  Patient would benefit from continued PT      Plan: Continue per plan of care  Precautions: S/P R RTC and biceps tenodesis 2022, S/P R RAHUL 3/27/23    Past Medical History:   Diagnosis Date   • Anesthesia complication     pt can still hear, describe whats going on in the room, and feel pressure last 2 surgeries  • Asthma    • Candidiasis of mouth     LA   2/26/15    R    16     • Exposure to viral disease     LA   16   R    3/24/16     • Otalgia 2012    LA   12    R   16         Insurance:  AMA/CMS Eval/ Re-eval POC expires Elias Petersen #/ Referral # Total   Visits  Start date  Expiration date Extension  Visit limitation? PT only or  PT+OT?  Co-Insurance   [employer] 12/22 3/16  No auth required     [facility] PT $20 Co-pay                                                               Surgery date:     Date 3/28/23 3/29/23 3/30/23 3/31/23   Visit Number              Manual       Scap mobs       Gentle GH and AC joint mobility                     Neuro Re-ed       Scap retract  10 20 20 20                        Thera Ex       Pulleys  Flex, abd, IR BTB x5' total  Flex, abd, IR BTB x5' total  Flex, abd, IR BTB x5' total  Flex, abd, IR BTB x5' total    Shoulder PROM Performed x10' Performed x10' Performed x10' Performed x10'   Pendulums  30 each  30 each  30 each  30 each    Elbow flex/ext 10 20 20 20           Cane abd in standing 10 20 20 20   IR BTB with SOS 3sx20 3sx20 3sx20 3sx20   Cane ER 20 20 20 20   Cane ext  20 20 20 20                                                                         Thera Activity       Patient education       Posture education       Re-evaluation x5'                                   Modalities       Heat pre x5' x5' x5'    Ice post    x5'        Access Code: [email or web address]  URL: [email or web address]  Date: 03/28/2023  Prepared by: Ana Cristina Deal    Exercises  - Circular Shoulder Pendulum with Table Support  - 1 x daily - 7 x weekly - 3 sets - 10 reps  - Horizontal Shoulder Pendulum with Table Support  - 1 x daily - 7 x weekly - 3 sets - 10 reps  - Flexion-Extension Shoulder Pendulum with Table Support  - 1 x daily - 7 x weekly - 3 sets - 10 reps  - Supine Shoulder Flexion AAROM with Hands Clasped  - 1 x daily - 7 x weekly - 3 sets - 10 reps  - Supine Shoulder External Rotation in 45 Degrees Abduction AAROM with Dowel  - 1 x daily - 7 x weekly - 3 sets - 10 reps  - Seated Scapular Retraction  - 1 x daily - 7 x weekly - 3 sets - 10 reps  - Standing Shoulder Internal Rotation Stretch with Towel  - 1 x daily - 7 x weekly - 3 sets - 10 reps - 3 hold  - Seated Shoulder Flexion AAROM with Pulley Behind  - 1 x daily - 7 x weekly - 3 sets - 10 reps  - Seated Shoulder Abduction AAROM with Pulley Behind  - 1 x daily - 7 x weekly - 3 sets - 10 reps  - Standing Shoulder Internal Rotation AAROM with Pulley  - 1 x daily - 7 x weekly - 3 sets - 10 reps

## 2023-04-01 LAB
C TRACH RRNA SPEC QL NAA+PROBE: NOT DETECTED
N GONORRHOEA RRNA SPEC QL NAA+PROBE: NOT DETECTED
T VAGINALIS RRNA SPEC QL NAA+PROBE: NOT DETECTED

## 2023-04-03 ENCOUNTER — OFFICE VISIT (OUTPATIENT)
Dept: OBGYN CLINIC | Facility: CLINIC | Age: 48
End: 2023-04-03

## 2023-04-03 ENCOUNTER — OFFICE VISIT (OUTPATIENT)
Dept: PHYSICAL THERAPY | Facility: CLINIC | Age: 48
End: 2023-04-03

## 2023-04-03 VITALS
HEIGHT: 65 IN | TEMPERATURE: 97.7 F | SYSTOLIC BLOOD PRESSURE: 128 MMHG | HEART RATE: 58 BPM | DIASTOLIC BLOOD PRESSURE: 86 MMHG | BODY MASS INDEX: 25.49 KG/M2 | WEIGHT: 153 LBS

## 2023-04-03 DIAGNOSIS — M75.01 ADHESIVE CAPSULITIS OF RIGHT SHOULDER: ICD-10-CM

## 2023-04-03 DIAGNOSIS — M25.511 CHRONIC RIGHT SHOULDER PAIN: ICD-10-CM

## 2023-04-03 DIAGNOSIS — Z98.890 S/P RIGHT ROTATOR CUFF REPAIR: Primary | ICD-10-CM

## 2023-04-03 DIAGNOSIS — G89.29 CHRONIC RIGHT SHOULDER PAIN: ICD-10-CM

## 2023-04-03 NOTE — PROGRESS NOTES
Daily Note     Today's date: 4/3/2023  Patient name: Chung Obregon  : 1975  MRN: [id number]  Referring provider: Long Mancia*  Dx:   Encounter Diagnosis     ICD-10-CM    1  S/P right rotator cuff repair  Z98 890       2  Chronic right shoulder pain  M25 511     G89 29           Start Time: 0900  Stop Time: 0945  Total time in clinic (min): 45 minutes    Subjective: Patient had a follow up with surgeon prior to today's session  He is pleased with her progress and would like her to start WB and strengthening of her R UE as tolerated  Objective: See treatment diary below      Assessment: Tolerated treatment well  Patient is progressing well at this time  Able to reach overhead with no increased pain  Will work towards strengthening next visit  Patient would benefit from continued PT      Plan: Continue per plan of care  Precautions: S/P R RTC and biceps tenodesis 2022, S/P R RAHUL 3/27/23    Past Medical History:   Diagnosis Date   • Anesthesia complication     pt can still hear, describe whats going on in the room, and feel pressure last 2 surgeries  • Asthma    • Candidiasis of mouth     LA   2/26/15    R    16     • Exposure to viral disease     LA   16   R    3/24/16     • Otalgia 2012    LA   12    R   16         Insurance:  AMA/CMS Eval/ Re-eval POC expires [facility] #/ Referral # Total   Visits  Start date  Expiration date Extension  Visit limitation? PT only or  PT+OT?  Co-Insurance   [employer] 12/22 3/16  No auth required     [facility] PT $20 Co-pay                                                               Surgery date:     Date 3/28/23 3/29/23 3/30/23 3/31/23 4/3/23   Visit Number                Manual        Scap mobs        Gentle GH and AC joint mobility                        Neuro Re-ed        Scap retract  10 20 20 20 20                           Thera Ex        Pulleys  Flex, abd, IR BTB x5' total  Flex, abd, IR BTB x5' total Flex, abd, IR BTB x5' total  Flex, abd, IR BTB x5' total  Flex, abd, IR BTB x5' total    Shoulder PROM Performed x10' Performed x10' Performed x10' Performed x10' Performed x10'   Pendulums  30 each  30 each  30 each  30 each  30 each    Elbow flex/ext 10 20 20 20 20            Cane abd in standing 10 20 20 20 20   IR BTB with SOS 3sx20 3sx20 3sx20 3sx20 3sx20   Cane ER 20 20 20 20 20   Cane ext  20 20 20 20 20   Wall slides      20   TB shoulder ext      20 RTB   TB shoulder row     20 RTB                                                           Thera Activity        Patient education        Posture education        Re-evaluation x5'                                        Modalities        Heat pre x5' x5' x5'  x5'   Ice post    x5'         Access Code: [email or web address]  URL: [email or web address]  Date: 03/28/2023  Prepared by: Kaiden Dapper    Exercises  - Circular Shoulder Pendulum with Table Support  - 1 x daily - 7 x weekly - 3 sets - 10 reps  - Horizontal Shoulder Pendulum with Table Support  - 1 x daily - 7 x weekly - 3 sets - 10 reps  - Flexion-Extension Shoulder Pendulum with Table Support  - 1 x daily - 7 x weekly - 3 sets - 10 reps  - Supine Shoulder Flexion AAROM with Hands Clasped  - 1 x daily - 7 x weekly - 3 sets - 10 reps  - Supine Shoulder External Rotation in 45 Degrees Abduction AAROM with Dowel  - 1 x daily - 7 x weekly - 3 sets - 10 reps  - Seated Scapular Retraction  - 1 x daily - 7 x weekly - 3 sets - 10 reps  - Standing Shoulder Internal Rotation Stretch with Towel  - 1 x daily - 7 x weekly - 3 sets - 10 reps - 3 hold  - Seated Shoulder Flexion AAROM with Pulley Behind  - 1 x daily - 7 x weekly - 3 sets - 10 reps  - Seated Shoulder Abduction AAROM with Pulley Behind  - 1 x daily - 7 x weekly - 3 sets - 10 reps  - Standing Shoulder Internal Rotation AAROM with Pulley  - 1 x daily - 7 x weekly - 3 sets - 10 reps

## 2023-04-03 NOTE — PROGRESS NOTES
Assessment/Plan:  1  S/P right rotator cuff repair        2  Adhesive capsulitis of right shoulder          Scribe Attestation    I,:  Patricia Barnett am acting as a scribe while in the presence of the attending physician :       I,:  Carla Plascencia MD personally performed the services described in this documentation    as scribed in my presence :         Duquesnemarybeth Thomasarben upon exam today demonstrates improvement in her range of motion following her right shoulder manipulation under anesthesia one week ago  While she does not quite reach full range of motion yet, I am encouraged she will get there as long as she continues with physical therapy and her directed HEP  She should continue formal physical therapy up until she has her mural  This mural may also provide a therapeutic effect for the shoulder, improving her overall function  She may perform other weight bearing activities as tolerated  Buster Carmona verbalized understanding and had no further questions today  She may follow up as needed  Subjective:   Alton Conway is a 49 y o  female who presents for follow up evaluation of her right shoulder  She is approximately 16 weeks status post right shoulder arthroscopy rotator cuff repair and is 7 days post manipulation under anesthesia  She reports her range of motion has improved significantly over the last week, however still has difficulty reaching behind her head to do her hair  She reports fairly consistent shoulder pain as well, but is able to manage  She attends physical therapy regularly and does directed home exercises as well  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 without too much difficulty  Review of Systems   Constitutional: Negative for chills, fever and unexpected weight change  HENT: Negative for nosebleeds and sore throat  Eyes: Negative for pain, redness and visual disturbance     Respiratory: Negative for cough, shortness of breath and wheezing  Cardiovascular: Negative for chest pain, palpitations and leg swelling  Gastrointestinal: Negative for nausea and vomiting  Endocrine: Negative for polydipsia and polyuria  Genitourinary: Negative for dysuria and hematuria  Musculoskeletal: Positive for arthralgias and myalgias  See HPI   Skin: Negative for rash and wound  Neurological: Negative for dizziness, numbness and headaches  Psychiatric/Behavioral: Negative for decreased concentration  The patient is not nervous/anxious  Past Medical History:   Diagnosis Date   • Anesthesia complication     pt can still hear, describe whats going on in the room, and feel pressure last 2 surgeries  • Asthma    • Candidiasis of mouth     LA   2/26/15    R    16     • Exposure to viral disease     LA   16   R    3/24/16     • Otalgia 2012    LA   12    R   16         Past Surgical History:   Procedure Laterality Date   •  SECTION     • DILATION AND EVACUATION     • NASAL FRACTURE SURGERY     • DE MNPJ W/ANES SHOULDER JT APPL FIXATION APPARATUS Right 3/27/2023    Procedure: Right Shoulder Manipulation Under Anesthesia;   Surgeon: Seng Donaldson MD;  Location: [facility];  Service: Orthopedics   • DE SURGICAL ARTHROSCOPY SHOULDER W/ROTATOR CUFF RPR Right 2022    Procedure: Shoulder Arthroscopy with Rotator Cuff Repair, Biceps tenodesis, and subacromial decompression;  Surgeon: Seng Donaldson MD;  Location: [facility];  Service: Orthopedics   • TONSILLECTOMY     • WRIST GANGLION EXCISION Left        Family History   Problem Relation Age of Onset   • Colon cancer Mother 61   • Hypertension Mother    • Depression Mother    • COPD Mother    • Cancer Mother    • Rheum arthritis Father    • Hyperlipidemia Father    • Depression Father    • Brain cancer Maternal Grandmother    • Diabetes type II Maternal Grandfather    • Diabetes Maternal Grandfather    • Lung cancer Paternal Uncle    • No Known Problems Maternal Uncle        Social History     Occupational History   • Not on file   Tobacco Use   • Smoking status: Former     Packs/day: 1 00     Years: 7 00     Pack years: 7 00     Types: Cigarettes     Start date: 1987     Quit date: 1994     Years since quittin 2   • Smokeless tobacco: Never   Vaping Use   • Vaping Use: Never used   Substance and Sexual Activity   • Alcohol use: Yes     Alcohol/week: 4 0 standard drinks     Types: 4 Glasses of wine per week     Comment: social    • Drug use: Yes     Types: Marijuana   • Sexual activity: Yes     Partners: Male     Birth control/protection: Male Sterilization         Current Outpatient Medications:   •  albuterol (PROVENTIL HFA,VENTOLIN HFA) 90 mcg/act inhaler, INHALE 2 PUFFS EVERY 6 HOURS AS NEEDED FOR WHEEZING, Disp: 18 g, Rfl: 1  •  Dulera 100-5 MCG/ACT inhaler, INHALE 2 PUFFS 2 (TWO) TIMES A DAY RINSE MOUTH AFTER USE  (Patient taking differently: 2 (two) times a day as needed), Disp: 39 Inhaler, Rfl: 1  •  levocetirizine (XYZAL) 5 MG tablet, Take 1 tablet (5 mg total) by mouth daily, Disp: 90 tablet, Rfl: 1  •  oxyCODONE (Roxicodone) 5 immediate release tablet, Take 1 tablet (5 mg total) by mouth every 4 (four) hours as needed for moderate pain for up to 15 doses Max Daily Amount: 30 mg, Disp: 15 tablet, Rfl: 0  •  ZOLMitriptan (ZOMIG) 5 MG tablet, TAKE 1 TABLET BY MOUTH EVERY DAY AS NEEDED FOR MIGRAINE, Disp: 6 tablet, Rfl: 3  •  EPINEPHrine (EPIPEN) 0 3 mg/0 3 mL SOAJ, Inject 0 3 mL (0 3 mg total) into a muscle once for 1 dose, Disp: 0 6 mL, Rfl: 0    Allergies   Allergen Reactions   • Treenut [Nuts - Food Allergy] Anaphylaxis       Objective:  Vitals:    23 0830   BP: 128/86   Pulse: 58   Temp: 97 7 °F (36 5 °C)       Right Shoulder Exam     Range of Motion   Active abduction: 140   External rotation: 70   Internal rotation 0 degrees: L5             Physical Exam  Vitals reviewed     HENT:      Head: Normocephalic and atraumatic  Eyes:      General:         Right eye: No discharge  Left eye: No discharge  Conjunctiva/sclera: Conjunctivae normal    Cardiovascular:      Rate and Rhythm: Normal rate  Pulmonary:      Effort: Pulmonary effort is normal  No respiratory distress  Musculoskeletal:         General: No swelling or tenderness  Right shoulder: Decreased range of motion  Cervical back: Normal range of motion and neck supple  Comments: See Ortho exam    Skin:     General: Skin is warm  Neurological:      Mental Status: She is alert  I have personally reviewed pertinent films in PACS and my interpretation is as follows: No imaging reviewed today  This document was created using speech voice recognition software  Grammatical errors, random word insertions, pronoun errors, and incomplete sentences are an occasional consequence of this system due to software limitations, ambient noise, and hardware issues  Any formal questions or concerns about content, text, or information contained within the body of this dictation should be directly addressed to the provider for clarification

## 2023-04-04 ENCOUNTER — OFFICE VISIT (OUTPATIENT)
Dept: PHYSICAL THERAPY | Facility: CLINIC | Age: 48
End: 2023-04-04

## 2023-04-04 DIAGNOSIS — Z98.890 S/P RIGHT ROTATOR CUFF REPAIR: Primary | ICD-10-CM

## 2023-04-04 DIAGNOSIS — G89.29 CHRONIC RIGHT SHOULDER PAIN: ICD-10-CM

## 2023-04-04 DIAGNOSIS — M25.511 CHRONIC RIGHT SHOULDER PAIN: ICD-10-CM

## 2023-04-04 NOTE — PROGRESS NOTES
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  Will continue to progress as able  Patient would benefit from continued PT      Plan: Continue per plan of care  Precautions: S/P R RTC and biceps tenodesis 2022, S/P R RAHUL 3/27/23    Past Medical History:   Diagnosis Date   • Anesthesia complication     pt can still hear, describe whats going on in the room, and feel pressure last 2 surgeries  • Asthma    • Candidiasis of mouth     LA   2/26/15    R    16     • Exposure to viral disease     LA   16   R    3/24/16     • Otalgia 2012    LA   12    R   16         Insurance:  AMA/CMS Eval/ Re-eval POC expires [facility] #/ Referral # Total   Visits  Start date  Expiration date Extension  Visit limitation? PT only or  PT+OT?  Co-Insurance   A[employer] 12/22 3/16  No auth required     [facility] PT $20 Co-pay                                                               Surgery date:     Date 3/28/23 3/29/23 3/30/23 3/31/23 4/3/23 4/4/23   Visit Number                  Manual         Scap mobs         Gentle GH and AC joint mobility                           Neuro Re-ed         Scap retract  10 20 20 20 20 20                              Thera Ex         Pulleys  Flex, abd, IR BTB x5' total  Flex, abd, IR BTB x5' total  Flex, abd, IR BTB x5' total  Flex, abd, IR BTB x5' total  Flex, abd, IR BTB x5' total  Flex, abd, IR BTB x5' total    Shoulder PROM Performed x10' Performed x10' Performed x10' Performed x10' Performed x10' Performed x10'   Pendulums  30 each  30 each  30 each  30 each  30 each  30 each   Elbow flex/ext 10 20 20 20 20 20             Cane abd in standing 10 20 20 20 20 20   IR BTB with SOS 3sx20 3sx20 3sx20 3sx20 3sx20 3sx20   Cane ER 20 20 20 20 20 20   Cane ext  20 20 20 20 20 20   Wall slides      20 20   TB shoulder ext      20 RTB 20 RTB   TB shoulder row     20 RTB 20 RTB                                                                  Thera Activity         Patient education         Posture education         Re-evaluation x5'                                             Modalities         Heat pre x5' x5' x5'  x5'    Ice post    x5'          Access Code: [email or web address]  URL: [email or web address]  Date: 03/28/2023  Prepared by: Nenita Kraus    Exercises  - Circular Shoulder Pendulum with Table Support  - 1 x daily - 7 x weekly - 3 sets - 10 reps  - Horizontal Shoulder Pendulum with Table Support  - 1 x daily - 7 x weekly - 3 sets - 10 reps  - Flexion-Extension Shoulder Pendulum with Table Support  - 1 x daily - 7 x weekly - 3 sets - 10 reps  - Supine Shoulder Flexion AAROM with Hands Clasped  - 1 x daily - 7 x weekly - 3 sets - 10 reps  - Supine Shoulder External Rotation in 45 Degrees Abduction AAROM with Dowel  - 1 x daily - 7 x weekly - 3 sets - 10 reps  - Seated Scapular Retraction  - 1 x daily - 7 x weekly - 3 sets - 10 reps  - Standing Shoulder Internal Rotation Stretch with Towel  - 1 x daily - 7 x weekly - 3 sets - 10 reps - 3 hold  - Seated Shoulder Flexion AAROM with Pulley Behind  - 1 x daily - 7 x weekly - 3 sets - 10 reps  - Seated Shoulder Abduction AAROM with Pulley Behind  - 1 x daily - 7 x weekly - 3 sets - 10 reps  - Standing Shoulder Internal Rotation AAROM with Pulley  - 1 x daily - 7 x weekly - 3 sets - 10 reps

## 2023-04-05 ENCOUNTER — OFFICE VISIT (OUTPATIENT)
Dept: PHYSICAL THERAPY | Facility: CLINIC | Age: 48
End: 2023-04-05

## 2023-04-05 ENCOUNTER — TELEPHONE (OUTPATIENT)
Dept: FAMILY MEDICINE CLINIC | Facility: CLINIC | Age: 48
End: 2023-04-05

## 2023-04-05 ENCOUNTER — OFFICE VISIT (OUTPATIENT)
Dept: FAMILY MEDICINE CLINIC | Facility: CLINIC | Age: 48
End: 2023-04-05

## 2023-04-05 VITALS
TEMPERATURE: 98.7 F | HEIGHT: 65 IN | SYSTOLIC BLOOD PRESSURE: 110 MMHG | DIASTOLIC BLOOD PRESSURE: 80 MMHG | HEART RATE: 60 BPM | RESPIRATION RATE: 16 BRPM | BODY MASS INDEX: 25.49 KG/M2 | WEIGHT: 153 LBS

## 2023-04-05 DIAGNOSIS — Z98.890 S/P RIGHT ROTATOR CUFF REPAIR: Primary | ICD-10-CM

## 2023-04-05 DIAGNOSIS — G89.29 CHRONIC RIGHT SHOULDER PAIN: ICD-10-CM

## 2023-04-05 DIAGNOSIS — M25.511 CHRONIC RIGHT SHOULDER PAIN: ICD-10-CM

## 2023-04-05 DIAGNOSIS — K13.0 LIP DRYNESS: Primary | ICD-10-CM

## 2023-04-05 RX ORDER — TRIAMCINOLONE ACETONIDE 0.1 %
2 PASTE (GRAM) DENTAL 2 TIMES DAILY
Qty: 10 G | Refills: 0 | Status: SHIPPED | OUTPATIENT
Start: 2023-04-05

## 2023-04-05 NOTE — PROGRESS NOTES
"Assessment/Plan:  Advised to use cream as ordered and also will try coconut oil and if no improvement in couple of days then will let me know and can consider oral steroids or antibiotics as needed  1  Lip dryness  -     triamcinolone (KENALOG) 0 1 % oral topical paste; Apply 2 application  topically 2 (two) times a day            Patient Instructions:  Supportive care discussed and advised  Advised to RTO for any worsening and no improvement  Follow up for no improvement and worsening of conditions  Patient advised and educated when to see immediate medical care  Return if symptoms worsen or fail to improve  Future Appointments   Date Time Provider Tahmina Contreras   4/5/2023  1:45 PM AURELIA York [facility]   4/6/2023  8:45 AM Martina Silva PT [facility]   4/7/2023  8:00 AM Martina Silva PT [facility]   4/10/2023  8:00 AM Martina Silva, PT [facility]   4/12/2023  8:45 AM Martina Silva PT [facility]   4/25/2023  7:30 AM [facility]           Subjective:      Patient ID: Cirilo Andrews is a 51 y o  female  Chief Complaint   Patient presents with   • Lip Swelling     C/O swollen, cracked and painful lips for 6 weeks Jmoyle LPN         Vitals:  /80   Pulse 60   Temp 98 7 °F (37 1 °C)   Resp 16   Ht 5' 5\" (1 651 m)   Wt 69 4 kg (153 lb)   LMP 03/27/2023 (Exact Date) Comment: neg preg test  BMI 25 46 kg/m²     HPI  Patient stated that having cracked lips from couple of weeks and stated that gets discomfort at area too with some swelling  Denies fever, chills and oral moth sores  Stated that there is pimple like area under right nostril which flares up at times   Stated that keeps herself hydrated        PHQ-2/9 Depression Screening    Little interest or pleasure in doing things: 0 - not at all  Feeling down, depressed, or hopeless: 0 - not at all  PHQ-2 Score: 0  PHQ-2 Interpretation: Negative " depression screen             The following portions of the patient's history were reviewed and updated as appropriate: allergies, current medications, past family history, past medical history, past social history, past surgical history and problem list       Review of Systems   Constitutional: Negative for chills, diaphoresis, fatigue, fever and unexpected weight change  HENT: Negative for congestion, dental problem, drooling, ear discharge, ear pain, facial swelling, hearing loss, mouth sores, nosebleeds, postnasal drip, rhinorrhea, sinus pressure, sinus pain, sneezing, sore throat, tinnitus, trouble swallowing and voice change  As noted in HPI     Respiratory: Negative for cough, chest tightness, shortness of breath and wheezing  Cardiovascular: Negative  Gastrointestinal: Negative for abdominal pain, constipation, diarrhea, nausea and vomiting  Skin: Negative  Neurological: Negative for dizziness, light-headedness and headaches  Objective:    Social History     Tobacco Use   Smoking Status Former   • Packs/day: 1 00   • Years:    • Pack years:    • Types: Cigarettes   • Start date: 1987   • Quit date: 1994   • Years since quittin 2   Smokeless Tobacco Never       Allergies:    Allergies   Allergen Reactions   • Treenut [Nuts - Food Allergy] Anaphylaxis         Current Outpatient Medications   Medication Sig Dispense Refill   • albuterol (PROVENTIL HFA,VENTOLIN HFA) 90 mcg/act inhaler INHALE 2 PUFFS EVERY 6 HOURS AS NEEDED FOR WHEEZING 18 g 1   • Dulera 100-5 MCG/ACT inhaler INHALE 2 PUFFS 2 (TWO) TIMES A DAY RINSE MOUTH AFTER USE  (Patient taking differently: 2 (two) times a day as needed) 39 Inhaler 1   • EPINEPHrine (EPIPEN) 0 3 mg/0 3 mL SOAJ Inject 0 3 mL (0 3 mg total) into a muscle once for 1 dose 0 6 mL 0   • levocetirizine (XYZAL) 5 MG tablet Take 1 tablet (5 mg total) by mouth daily 90 tablet 1   • triamcinolone (KENALOG) 0 1 % oral topical paste Apply 2 application  topically 2 (two) times a day 10 g 0   • ZOLMitriptan (ZOMIG) 5 MG tablet TAKE 1 TABLET BY MOUTH EVERY DAY AS NEEDED FOR MIGRAINE 6 tablet 3     No current facility-administered medications for this visit  Physical Exam  Constitutional:       Appearance: Normal appearance  HENT:      Head: Normocephalic and atraumatic  Nose: Nose normal       Mouth/Throat:      Lips: Pink  Comments: Dryness with erythema noted on lips with some cracks  Eyes:      Conjunctiva/sclera: Conjunctivae normal    Cardiovascular:      Rate and Rhythm: Normal rate and regular rhythm  Pulses: Normal pulses  Pulmonary:      Effort: Pulmonary effort is normal       Breath sounds: Normal breath sounds  Skin:     General: Skin is warm and dry  Findings: No rash  Neurological:      Mental Status: She is alert and oriented to person, place, and time  Psychiatric:         Mood and Affect: Mood normal          Behavior: Behavior normal          Thought Content:  Thought content normal          Judgment: Judgment normal                      AURELIA Ann No complaints

## 2023-04-05 NOTE — TELEPHONE ENCOUNTER
Spoke to patient regarding her prescription  She stated that there was a mixup at the pharmacy with her insurance but she told me to disregard her message since its squared away    No further action at this time  Kevin Cadena

## 2023-04-05 NOTE — PROGRESS NOTES
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 with minimal difficulties  Objective: See treatment diary below      Assessment: Tolerated treatment well  Patient is progressing well at this time  Added strengthening activities today, as she is starting to paint over the course of the next several weeks  Will continue to progress as able  Patient would benefit from continued PT      Plan: Continue per plan of care  Precautions: S/P R RTC and biceps tenodesis 2022, S/P R RAHUL 3/27/23    Past Medical History:   Diagnosis Date   • Anesthesia complication     pt can still hear, describe whats going on in the room, and feel pressure last 2 surgeries  • Asthma    • Candidiasis of mouth     LA   2/26/15    R    16     • Exposure to viral disease     LA   16   R    3/24/16     • Otalgia 2012    LA   12    R   16         Insurance:  AMA/CMS Eval/ Re-eval POC expires [facility] Boy[facility] #/ Referral # Total   Visits  Start date  Expiration date Extension  Visit limitation? PT only or  PT+OT?  Co-Insurance   [employer]na 12/22 3/16  No auth required     [facility] PT $20 Co-pay                                                               Surgery date:     Date 3/31/23 4/3/23 4/4/23 4/5/23     Visit Number                  Manual         Scap mobs         Gentle GH and AC joint mobility                           Neuro Re-ed         Scap retract  20 20 20 20                                Thera Ex         Pulleys  Flex, abd, IR BTB x5' total  Flex, abd, IR BTB x5' total  Flex, abd, IR BTB x5' total  Flex, abd, IR BTB x5' total      Shoulder PROM Performed x10' Performed x10' Performed x10' Performed x10'     Pendulums  30 each  30 each  30 each 30 each     Elbow flex/ext 20 20 20 20               Cane abd in standing 20 20 20 20     IR BTB with SOS 3sx20 3sx20 3sx20 3sx20     Cane ER 20 20 20 20     Cane ext  20 20 20 20     Wall slides   20 20 20     TB shoulder ext   20 RTB 20 RTB 20 RTB     TB shoulder row  20 RTB 20 RTB 20 RTB     TB shoulder IR/ER    20 RTB                                                           Thera Activity         Patient education         Posture education         Re-evaluation                                             Modalities         Heat pre  x5'  5 min      Ice post              Access Code: [email or web address]  URL: [email or web address]  Date: 03/28/2023  Prepared by: Kaiden Dapper    Exercises  - Circular Shoulder Pendulum with Table Support  - 1 x daily - 7 x weekly - 3 sets - 10 reps  - Horizontal Shoulder Pendulum with Table Support  - 1 x daily - 7 x weekly - 3 sets - 10 reps  - Flexion-Extension Shoulder Pendulum with Table Support  - 1 x daily - 7 x weekly - 3 sets - 10 reps  - Supine Shoulder Flexion AAROM with Hands Clasped  - 1 x daily - 7 x weekly - 3 sets - 10 reps  - Supine Shoulder External Rotation in 45 Degrees Abduction AAROM with Dowel  - 1 x daily - 7 x weekly - 3 sets - 10 reps  - Seated Scapular Retraction  - 1 x daily - 7 x weekly - 3 sets - 10 reps  - Standing Shoulder Internal Rotation Stretch with Towel  - 1 x daily - 7 x weekly - 3 sets - 10 reps - 3 hold  - Seated Shoulder Flexion AAROM with Pulley Behind  - 1 x daily - 7 x weekly - 3 sets - 10 reps  - Seated Shoulder Abduction AAROM with Pulley Behind  - 1 x daily - 7 x weekly - 3 sets - 10 reps  - Standing Shoulder Internal Rotation AAROM with Pulley  - 1 x daily - 7 x weekly - 3 sets - 10 reps

## 2023-04-06 ENCOUNTER — OFFICE VISIT (OUTPATIENT)
Dept: PHYSICAL THERAPY | Facility: CLINIC | Age: 48
End: 2023-04-06

## 2023-04-06 DIAGNOSIS — M25.511 CHRONIC RIGHT SHOULDER PAIN: ICD-10-CM

## 2023-04-06 DIAGNOSIS — G89.29 CHRONIC RIGHT SHOULDER PAIN: ICD-10-CM

## 2023-04-06 DIAGNOSIS — Z98.890 S/P RIGHT ROTATOR CUFF REPAIR: Primary | ICD-10-CM

## 2023-04-06 NOTE — PROGRESS NOTES
Daily Note     Today's date: 2023  Patient name: Martha Benavides  : 1975  MRN: [id number]  Referring provider: Pradeep Delgado  Dx:   Encounter Diagnosis     ICD-10-CM    1  S/P right rotator cuff repair  Z98 890       2  Chronic right shoulder pain  M25 511     G89 29           Start Time: 0840  Stop Time: 0930  Total time in clinic (min): 50 minutes    Subjective: Patient states that she feels pretty good today  Objective: See treatment diary below      Assessment: Tolerated treatment well  Patient is progressing well at this time  No adverse effects noted throughout session  Patient would benefit from continued PT      Plan: Continue per plan of care  Precautions: S/P R RTC and biceps tenodesis 2022, S/P R RAHUL 3/27/23    Past Medical History:   Diagnosis Date   • Anesthesia complication     pt can still hear, describe whats going on in the room, and feel pressure last 2 surgeries  • Asthma    • Candidiasis of mouth     LA   2/26/15    R    16     • Exposure to viral disease     LA   16   R    3/24/16     • Otalgia 2012    LA   12    R   16         Insurance:  AMA/CMS Eval/ Re-eval POC expires Shannon Kenn #/ Referral # Total   Visits  Start date  Expiration date Extension  Visit limitation? PT only or  PT+OT?  Co-Insurance   [employer] 12/22 3/16  No auth required     [facility] PT $20 Co-pay                                                               Surgery date:     Date 3/31/23 4/3/23 4/4/23 4/5/23 4/6/23    Visit Number                  Manual         Scap mobs         Gentle GH and AC joint mobility     Gentle performed x5' total                       Neuro Re-ed         Scap retract  20 20 20 20                                Thera Ex         Pulleys  Flex, abd, IR BTB x5' total  Flex, abd, IR BTB x5' total  Flex, abd, IR BTB x5' total  Flex, abd, IR BTB x5' total  Flex, abd, IR BTB x5' total     Shoulder PROM Performed x10' Performed x10' Performed x10' Performed x10' Performed x10'    Pendulums  30 each  30 each  30 each 30 each D/C to HEP    Elbow flex/ext 20 20 20 20     Cane abd in standing 20 20 20 20 20    IR BTB with SOS 3sx20 3sx20 3sx20 3sx20     Cane ER 20 20 20 20 20    Cane ext  20 20 20 20 20    Wall slides   20 20 20 With loop 2x10     TB shoulder ext   20 RTB 20 RTB 20 RTB 2x10 BTB    TB shoulder row  20 RTB 20 RTB 20 RTB 2x10 BTB    TB shoulder IR/ER    20 RTB 2x10 BTB    No monies     2x10 light loop    Tricep ext      2x10 BTB    Pushup with plus     x10                                Thera Activity         Patient education         Posture education         Re-evaluation                                             Modalities         Heat pre  x5'  5 min      Ice post              Access Code: [email or web address]  URL: [email or web address]  Date: 03/28/2023  Prepared by: Joanna Frizzle    Exercises  - Circular Shoulder Pendulum with Table Support  - 1 x daily - 7 x weekly - 3 sets - 10 reps  - Horizontal Shoulder Pendulum with Table Support  - 1 x daily - 7 x weekly - 3 sets - 10 reps  - Flexion-Extension Shoulder Pendulum with Table Support  - 1 x daily - 7 x weekly - 3 sets - 10 reps  - Supine Shoulder Flexion AAROM with Hands Clasped  - 1 x daily - 7 x weekly - 3 sets - 10 reps  - Supine Shoulder External Rotation in 45 Degrees Abduction AAROM with Dowel  - 1 x daily - 7 x weekly - 3 sets - 10 reps  - Seated Scapular Retraction  - 1 x daily - 7 x weekly - 3 sets - 10 reps  - Standing Shoulder Internal Rotation Stretch with Towel  - 1 x daily - 7 x weekly - 3 sets - 10 reps - 3 hold  - Seated Shoulder Flexion AAROM with Pulley Behind  - 1 x daily - 7 x weekly - 3 sets - 10 reps  - Seated Shoulder Abduction AAROM with Pulley Behind  - 1 x daily - 7 x weekly - 3 sets - 10 reps  - Standing Shoulder Internal Rotation AAROM with Pulley  - 1 x daily - 7 x weekly - 3 sets - 10 reps

## 2023-04-07 ENCOUNTER — OFFICE VISIT (OUTPATIENT)
Dept: PHYSICAL THERAPY | Facility: CLINIC | Age: 48
End: 2023-04-07

## 2023-04-07 DIAGNOSIS — Z98.890 S/P RIGHT ROTATOR CUFF REPAIR: Primary | ICD-10-CM

## 2023-04-07 DIAGNOSIS — G89.29 CHRONIC RIGHT SHOULDER PAIN: ICD-10-CM

## 2023-04-07 DIAGNOSIS — M25.511 CHRONIC RIGHT SHOULDER PAIN: ICD-10-CM

## 2023-04-07 NOTE — PROGRESS NOTES
Daily Note     Today's date: 2023  Patient name: Han Sun  : 1975  MRN: [id number]  Referring provider: Rudy Alicea*  Dx:   Encounter Diagnosis     ICD-10-CM    1  S/P right rotator cuff repair  Z98 890       2  Chronic right shoulder pain  M25 511     G89 29           Start Time: 0800  Stop Time: 0845  Total time in clinic (min): 45 minutes    Subjective: Patient is painting another window today  Objective: See treatment diary below      Assessment: Tolerated treatment well  Patient demonstrates excellent ROM of her R shoulder today  Strength is progressing nicely at this point  Will continue to progress as able  Patient would benefit from continued PT      Plan: Continue per plan of care  Precautions: S/P R RTC and biceps tenodesis 2022, S/P R RAHUL 3/27/23    Past Medical History:   Diagnosis Date   • Anesthesia complication     pt can still hear, describe whats going on in the room, and feel pressure last 2 surgeries  • Asthma    • Candidiasis of mouth     LA   2/26/15    R    16     • Exposure to viral disease     LA   16   R    3/24/16     • Otalgia 2012    LA   12    R   16         Insurance:  AMA/CMS Eval/ Re-eval POC expires Elier Torre #/ Referral # Total   Visits  Start date  Expiration date Extension  Visit limitation? PT only or  PT+OT?  Co-Insurance   Aetna 12/22 3/16  No auth required     [facility] PT $20 Co-pay                                                               Surgery date:     Date 3/31/23 4/3/23 4/4/23 4/5/23 4/6/23 4/7/23   Visit Number                  Manual         Scap mobs         Gentle GH and AC joint mobility     Gentle performed x5' total                       Neuro Re-ed         Scap retract  20 20 20 20                                Thera Ex         Pulleys  Flex, abd, IR BTB x5' total  Flex, abd, IR BTB x5' total  Flex, abd, IR BTB x5' total  Flex, abd, IR BTB x5' total  Flex, abd, IR BTB x5' total  Flex, abd, IR BTB x5' total    Shoulder PROM Performed x10' Performed x10' Performed x10' Performed x10' Performed x10'    Pendulums  30 each  30 each  30 each 30 each D/C to HEP    Elbow flex/ext 20 20 20 20     Cane abd in standing 20 20 20 20 20 20   IR BTB with SOS 3sx20 3sx20 3sx20 3sx20     Cane ER 20 20 20 20 20 20   Cane ext  20 20 20 20 20 20   Wall slides   20 20 20 With loop 2x10  With loop 2x10    TB shoulder ext   20 RTB 20 RTB 20 RTB 2x10 BTB 2x10 BTB   TB shoulder row  20 RTB 20 RTB 20 RTB 2x10 BTB 2x10 BTB   TB shoulder IR/ER    20 RTB 2x10 BTB 2x10 BTB   No monies     2x10 light loop 2x10 light loop   Tricep ext      2x10 BTB 2x10 BTB   Pushup with plus     x10  x10                               Thera Activity         Patient education         Posture education         Re-evaluation                                             Modalities         Heat pre  x5'  5 min      Ice post              Access Code: [email or web address]  URL: [email or web address]  Date: 03/28/2023  Prepared by: Kaiden Dapper    Exercises  - Circular Shoulder Pendulum with Table Support  - 1 x daily - 7 x weekly - 3 sets - 10 reps  - Horizontal Shoulder Pendulum with Table Support  - 1 x daily - 7 x weekly - 3 sets - 10 reps  - Flexion-Extension Shoulder Pendulum with Table Support  - 1 x daily - 7 x weekly - 3 sets - 10 reps  - Supine Shoulder Flexion AAROM with Hands Clasped  - 1 x daily - 7 x weekly - 3 sets - 10 reps  - Supine Shoulder External Rotation in 45 Degrees Abduction AAROM with Dowel  - 1 x daily - 7 x weekly - 3 sets - 10 reps  - Seated Scapular Retraction  - 1 x daily - 7 x weekly - 3 sets - 10 reps  - Standing Shoulder Internal Rotation Stretch with Towel  - 1 x daily - 7 x weekly - 3 sets - 10 reps - 3 hold  - Seated Shoulder Flexion AAROM with Pulley Behind  - 1 x daily - 7 x weekly - 3 sets - 10 reps  - Seated Shoulder Abduction AAROM with Pulley Behind  - 1 x daily - 7 x weekly - 3 sets - 10 reps  - Standing Shoulder Internal Rotation AAROM with Pulley  - 1 x daily - 7 x weekly - 3 sets - 10 reps

## 2023-04-11 ENCOUNTER — APPOINTMENT (OUTPATIENT)
Dept: PHYSICAL THERAPY | Facility: CLINIC | Age: 48
End: 2023-04-11

## 2023-04-21 RX ORDER — MISOPROSTOL 200 UG/1
TABLET ORAL
Qty: 2 TABLET | Refills: 0 | Status: CANCELLED | OUTPATIENT
Start: 2023-04-21

## 2023-04-25 ENCOUNTER — PROCEDURE VISIT (OUTPATIENT)
Dept: OBGYN CLINIC | Facility: CLINIC | Age: 48
End: 2023-04-25

## 2023-04-25 VITALS
DIASTOLIC BLOOD PRESSURE: 88 MMHG | HEIGHT: 65 IN | SYSTOLIC BLOOD PRESSURE: 138 MMHG | BODY MASS INDEX: 25.56 KG/M2 | WEIGHT: 153.4 LBS

## 2023-04-25 DIAGNOSIS — Z30.430 ENCOUNTER FOR INSERTION OF MIRENA IUD: Primary | ICD-10-CM

## 2023-04-25 LAB — SL AMB POCT URINE HCG: NEGATIVE

## 2023-04-25 NOTE — PROGRESS NOTES
Iud insertions    Date/Time: 4/25/2023 8:13 AM  Performed by: Leopold Sprinkles, CRNP  Authorized by: Keily Coreas MD     Other Assisting Provider: No    Verbal consent obtained?: Yes    Written consent obtained?: No    Risks and benefits: Risks, benefits and alternatives were discussed    Consent given by:  Patient  Time Out:     Time out: Immediately prior to the procedure a time out was called      Time out performed at:  4/25/2023 7:55 AM  Patient states understanding of procedure being performed: Yes    Patient's understanding of procedure matches consent: Yes    Procedure consent matches procedure scheduled: Yes    Relevant documents present and verified: Yes    Test results available and properly labeled: Yes    Site marked: No    Radiology Images displayed and confirmed  If images not available, report reviewed: Yes    Required items: Required blood products, implants, devices and special equipment available    Patient identity confirmed:  Verbally with patient  Procedure:     Pelvic exam performed: yes      Negative GC/chlamydia test: no      Negative urine pregnancy test: yes      Negative serum pregnancy test: no      Cervix cleaned and prepped: yes      Speculum placed in vagina: yes      Tenaculum applied to cervix: no      Allis applied to cervix: yes (but removed )      Uterus sounded: yes      Uterus sound depth (cm):  7    IUD inserted with no complications: yes      IUD type:  Mirena    Strings trimmed: yes    Post-procedure:     Patient tolerated procedure well: yes      Patient will follow up after next period: yes    Comments:      Pt for IUD insertion today  Pt has already been previously counseled on all risks/benefits of IUD usage/insertion  I again reviewed with pt risks of insertion including infection, perforation and expulsion  Reviewed with pt increased ectopic risk, migration, PID, infertility, high risk pregnancy if a pregnancy were to occur   I reviewed possible irregular menses and side effects of Mirena  We reviewed changes in menstrual cycles that may occur  Pt aware 8 year coverage for birth control and 5 year coverage for menorrhagia, but can have it removed at any point if she desires  Pt tolerated procedure well and no complications with placement  Aware nothing PV x 48 hours  Will call with any cramping that does not resolve, fevers/chills  Etc

## 2023-05-20 DIAGNOSIS — G43.009 MIGRAINE WITHOUT AURA AND WITHOUT STATUS MIGRAINOSUS, NOT INTRACTABLE: ICD-10-CM

## 2023-05-20 DIAGNOSIS — K13.0 LIP DRYNESS: ICD-10-CM

## 2023-05-20 DIAGNOSIS — T78.40XD ALLERGIC REACTION, SUBSEQUENT ENCOUNTER: ICD-10-CM

## 2023-05-20 RX ORDER — EPINEPHRINE 0.3 MG/.3ML
0.3 INJECTION SUBCUTANEOUS ONCE
Qty: 2 EACH | Refills: 1 | Status: SHIPPED | OUTPATIENT
Start: 2023-05-20 | End: 2023-05-20

## 2023-05-20 RX ORDER — ZOLMITRIPTAN 5 MG/1
TABLET, FILM COATED ORAL
Qty: 6 TABLET | Refills: 3 | Status: SHIPPED | OUTPATIENT
Start: 2023-05-20

## 2023-05-20 RX ORDER — TRIAMCINOLONE ACETONIDE 0.1 %
2 PASTE (GRAM) DENTAL 2 TIMES DAILY
Qty: 10 G | Refills: 0 | Status: SHIPPED | OUTPATIENT
Start: 2023-05-20

## 2023-06-12 DIAGNOSIS — J45.30 MILD PERSISTENT ASTHMA, UNSPECIFIED WHETHER COMPLICATED: ICD-10-CM

## 2023-06-12 RX ORDER — ALBUTEROL SULFATE 90 UG/1
AEROSOL, METERED RESPIRATORY (INHALATION)
Qty: 18 G | Refills: 1 | Status: SHIPPED | OUTPATIENT
Start: 2023-06-12

## 2023-07-10 ENCOUNTER — OFFICE VISIT (OUTPATIENT)
Dept: FAMILY MEDICINE CLINIC | Facility: CLINIC | Age: 48
End: 2023-07-10
Payer: COMMERCIAL

## 2023-07-10 VITALS
RESPIRATION RATE: 18 BRPM | HEART RATE: 62 BPM | OXYGEN SATURATION: 99 % | BODY MASS INDEX: 25.83 KG/M2 | DIASTOLIC BLOOD PRESSURE: 80 MMHG | TEMPERATURE: 97.6 F | SYSTOLIC BLOOD PRESSURE: 130 MMHG | WEIGHT: 155 LBS | HEIGHT: 65 IN

## 2023-07-10 DIAGNOSIS — K13.0 LIP DRYNESS: Primary | ICD-10-CM

## 2023-07-10 DIAGNOSIS — Z13.0 SCREENING FOR DEFICIENCY ANEMIA: ICD-10-CM

## 2023-07-10 DIAGNOSIS — Z13.6 SCREENING FOR CARDIOVASCULAR CONDITION: ICD-10-CM

## 2023-07-10 PROCEDURE — 99213 OFFICE O/P EST LOW 20 MIN: CPT | Performed by: FAMILY MEDICINE

## 2023-07-10 NOTE — PROGRESS NOTES
Name: Farida Wayne      : 1975      MRN: [id number]  Encounter Provider: Allan Cutler DO  Encounter Date: 7/10/2023   Encounter department: [facility]     1. Lip dryness  Comments:  not resolving with triamcinolone  Orders:  -     Sjogren's Antibodies; Future  -     C-reactive protein; Future  -     RACHEL Comprehensive Panel; Future  -     TSH, 3rd generation; Future  -     Sjogren's Antibodies  -     C-reactive protein  -     RACHEL Comprehensive Panel  -     TSH, 3rd generation    2. Screening for cardiovascular condition  -     Comprehensive metabolic panel; Future  -     Lipid Panel with Direct LDL reflex; Future  -     Comprehensive metabolic panel  -     Lipid Panel with Direct LDL reflex    3. Screening for deficiency anemia  -     CBC; Future  -     CBC        No follow-ups on file. Subjective      She has been having chapped lips for at least 6 months. She has tried triamcinolone that temporarily helps, but it keeps coming back. The pain will be burning. She is not licking her lips    Review of Systems    Current Outpatient Medications on File Prior to Visit   Medication Sig   • albuterol (PROVENTIL HFA,VENTOLIN HFA) 90 mcg/act inhaler Inhale 2 puffs every 6 hours as needed for wheezing   • Dulera 100-5 MCG/ACT inhaler INHALE 2 PUFFS 2 (TWO) TIMES A DAY RINSE MOUTH AFTER USE. (Patient taking differently: 2 (two) times a day as needed)   • EPINEPHrine (EPIPEN) 0.3 mg/0.3 mL SOAJ INJECT 0.3 ML (0.3 MG TOTAL) INTO A MUSCLE ONCE FOR 1 DOSE   • levocetirizine (XYZAL) 5 MG tablet Take 1 tablet (5 mg total) by mouth daily   • miSOPROStol (Cytotec) 200 mcg tablet Place inside vagina at bedtime, night before IUD insertion. • triamcinolone (KENALOG) 0.1 % oral topical paste APPLY 2 APPLICATION.  TOPICALLY 2 (TWO) TIMES A DAY   • ZOLMitriptan (ZOMIG) 5 MG tablet TAKE 1 TABLET BY MOUTH EVERY DAY AS NEEDED FOR MIGRAINE       Objective     /80   Pulse 62 Temp 97.6 °F (36.4 °C)   Resp 18   Ht 5' 5" (1.651 m)   Wt 70.3 kg (155 lb)   SpO2 99%   BMI 25.79 kg/m²     Physical Exam  Vitals and nursing note reviewed. Constitutional:       Appearance: She is well-developed. HENT:      Head: Normocephalic and atraumatic. Right Ear: Tympanic membrane and external ear normal.      Left Ear: Tympanic membrane and external ear normal.   Cardiovascular:      Rate and Rhythm: Normal rate and regular rhythm. Heart sounds: Normal heart sounds. No murmur heard. No friction rub. Pulmonary:      Effort: Pulmonary effort is normal. No respiratory distress. Breath sounds: Normal breath sounds. No wheezing or rales. Musculoskeletal:      Right lower leg: No edema. Left lower leg: No edema. Skin:     Findings: Rash (dry chapped skin upper and lower lip, see picture) present.             Percy Herndon, DO

## 2023-07-15 LAB
ALBUMIN SERPL-MCNC: 4.5 G/DL (ref 3.9–4.9)
ALBUMIN/GLOB SERPL: 2.1 {RATIO} (ref 1.2–2.2)
ALP SERPL-CCNC: 54 IU/L (ref 44–121)
ALT SERPL-CCNC: 8 IU/L (ref 0–32)
AST SERPL-CCNC: 18 IU/L (ref 0–40)
BILIRUB SERPL-MCNC: 0.7 MG/DL (ref 0–1.2)
BUN SERPL-MCNC: 17 MG/DL (ref 6–24)
BUN/CREAT SERPL: 18 (ref 9–23)
CALCIUM SERPL-MCNC: 9.2 MG/DL (ref 8.7–10.2)
CENTROMERE B AB SER-ACNC: <0.2 AI (ref 0–0.9)
CHLORIDE SERPL-SCNC: 102 MMOL/L (ref 96–106)
CHOLEST SERPL-MCNC: 218 MG/DL (ref 100–199)
CHROMATIN AB SERPL-ACNC: <0.2 AI (ref 0–0.9)
CO2 SERPL-SCNC: 23 MMOL/L (ref 20–29)
CREAT SERPL-MCNC: 0.95 MG/DL (ref 0.57–1)
CRP SERPL-MCNC: <1 MG/L (ref 0–10)
DSDNA AB SER-ACNC: 2 IU/ML (ref 0–9)
EGFR: 73 ML/MIN/1.73
ENA JO1 AB SER-ACNC: <0.2 AI (ref 0–0.9)
ENA RNP AB SER-ACNC: <0.2 AI (ref 0–0.9)
ENA SCL70 AB SER-ACNC: <0.2 AI (ref 0–0.9)
ENA SM AB SER-ACNC: <0.2 AI (ref 0–0.9)
ENA SS-A AB SER-ACNC: <0.2 AI (ref 0–0.9)
ENA SS-B AB SER-ACNC: <0.2 AI (ref 0–0.9)
ERYTHROCYTE [DISTWIDTH] IN BLOOD BY AUTOMATED COUNT: 12.1 % (ref 11.7–15.4)
GLOBULIN SER-MCNC: 2.1 G/DL (ref 1.5–4.5)
GLUCOSE SERPL-MCNC: 90 MG/DL (ref 70–99)
HCT VFR BLD AUTO: 41.8 % (ref 34–46.6)
HDLC SERPL-MCNC: 74 MG/DL
HGB BLD-MCNC: 13.9 G/DL (ref 11.1–15.9)
LDLC SERPL CALC-MCNC: 116 MG/DL (ref 0–99)
LDLC/HDLC SERPL: 1.6 RATIO (ref 0–3.2)
MCH RBC QN AUTO: 33.2 PG (ref 26.6–33)
MCHC RBC AUTO-ENTMCNC: 33.3 G/DL (ref 31.5–35.7)
MCV RBC AUTO: 100 FL (ref 79–97)
MICRODELETION SYND BLD/T FISH: NORMAL
PLATELET # BLD AUTO: 316 X10E3/UL (ref 150–450)
POTASSIUM SERPL-SCNC: 4.5 MMOL/L (ref 3.5–5.2)
PROT SERPL-MCNC: 6.6 G/DL (ref 6–8.5)
RBC # BLD AUTO: 4.19 X10E6/UL (ref 3.77–5.28)
SL AMB SEE BELOW:: NORMAL
SL AMB VLDL CHOLESTEROL CALC: 28 MG/DL (ref 5–40)
SODIUM SERPL-SCNC: 137 MMOL/L (ref 134–144)
TRIGL SERPL-MCNC: 164 MG/DL (ref 0–149)
TSH SERPL DL<=0.005 MIU/L-ACNC: 3.36 UIU/ML (ref 0.45–4.5)
WBC # BLD AUTO: 11.7 X10E3/UL (ref 3.4–10.8)

## 2023-07-26 ENCOUNTER — TELEPHONE (OUTPATIENT)
Dept: FAMILY MEDICINE CLINIC | Facility: CLINIC | Age: 48
End: 2023-07-26

## 2023-07-26 DIAGNOSIS — K13.0 LIP DRYNESS: Primary | ICD-10-CM

## 2023-07-26 NOTE — TELEPHONE ENCOUNTER
Reviewed results from [address] was not completed    She will need it re-drawn. I left a message on her voice mail to let her know.   New order done    Message complete  Kenzie Flaherty, DO

## 2023-07-26 NOTE — TELEPHONE ENCOUNTER
7/26/2023 1:42 PM called Jaqu[facility] Lamb regarding their lab results    We still haven't received the sjogren's results    She had the labs drawn a week ago.     Michelle Johns DO

## 2023-07-26 NOTE — TELEPHONE ENCOUNTER
----- Message from [facility] sent at 7/25/2023  1:19 PM EDT -----  Regarding: FW: Test results follow up  Contact: [phone]    ----- Message -----  From: Juan Jose Gold  Sent: 7/25/2023   1:07 PM EDT  To: THE [facility]  Subject: Test results follow up                           Could you please reach out to me to discuss my recent test results? Thank you!     [place]

## 2023-07-26 NOTE — TELEPHONE ENCOUNTER
Yes. Placed in folder for review  [facility] [place] [address] [facility]

## 2023-07-28 DIAGNOSIS — R79.89 ABNORMAL CBC: Primary | ICD-10-CM

## 2023-07-28 LAB
ENA SS-A AB SER-ACNC: <0.2 AI (ref 0–0.9)
ENA SS-B AB SER-ACNC: <0.2 AI (ref 0–0.9)

## 2023-08-16 ENCOUNTER — TELEPHONE (OUTPATIENT)
Dept: DERMATOLOGY | Facility: CLINIC | Age: 48
End: 2023-08-16

## 2023-08-16 NOTE — TELEPHONE ENCOUNTER
Rec'd call from patient requesting NEW for BLISTERING LIPS. PCP is recommending biopsy. Explained wait list / cancellation list / [address] [place] [address] notification to patient. Understanding was verbalized.     Created wait list.

## 2023-08-17 DIAGNOSIS — R21 RASH: Primary | ICD-10-CM

## 2023-08-17 DIAGNOSIS — R53.83 FATIGUE, UNSPECIFIED TYPE: ICD-10-CM

## 2023-08-17 DIAGNOSIS — Z79.899 ENCOUNTER FOR LONG-TERM CURRENT USE OF MEDICATION: ICD-10-CM

## 2023-08-24 LAB
25(OH)D3+25(OH)D2 SERPL-MCNC: 31.1 NG/ML (ref 30–100)
VIT B12 SERPL-MCNC: 597 PG/ML (ref 232–1245)
VIT B6 SERPL-MCNC: 160 UG/L (ref 3.4–65.2)
ZINC SERPL-MCNC: 75 UG/DL (ref 44–115)

## 2023-08-30 LAB
BASOPHILS # BLD AUTO: 0 X10E3/UL (ref 0–0.2)
BASOPHILS NFR BLD AUTO: 0 %
EOSINOPHIL # BLD AUTO: 0.2 X10E3/UL (ref 0–0.4)
EOSINOPHIL NFR BLD AUTO: 3 %
ERYTHROCYTE [DISTWIDTH] IN BLOOD BY AUTOMATED COUNT: 12.1 % (ref 11.7–15.4)
HCT VFR BLD AUTO: 36.6 % (ref 34–46.6)
HGB BLD-MCNC: 12.3 G/DL (ref 11.1–15.9)
IMM GRANULOCYTES # BLD: 0 X10E3/UL (ref 0–0.1)
IMM GRANULOCYTES NFR BLD: 0 %
LYMPHOCYTES # BLD AUTO: 1.8 X10E3/UL (ref 0.7–3.1)
LYMPHOCYTES NFR BLD AUTO: 23 %
MCH RBC QN AUTO: 32.5 PG (ref 26.6–33)
MCHC RBC AUTO-ENTMCNC: 33.6 G/DL (ref 31.5–35.7)
MCV RBC AUTO: 97 FL (ref 79–97)
MONOCYTES # BLD AUTO: 0.7 X10E3/UL (ref 0.1–0.9)
MONOCYTES NFR BLD AUTO: 8 %
NEUTROPHILS # BLD AUTO: 5.2 X10E3/UL (ref 1.4–7)
NEUTROPHILS NFR BLD AUTO: 66 %
PLATELET # BLD AUTO: 265 X10E3/UL (ref 150–450)
RBC # BLD AUTO: 3.79 X10E6/UL (ref 3.77–5.28)
VIT B1 BLD-SCNC: 156.1 NMOL/L (ref 66.5–200)
WBC # BLD AUTO: 7.9 X10E3/UL (ref 3.4–10.8)

## 2023-10-04 NOTE — DISCHARGE INSTR - AVS FIRST PAGE
Continue ambulating with camboot and walker  PT/OT for dispo planning Instruction Sheet following shoulder manipulation    Sling:   Wear your sling until your block has worn off and you have full motor control of your arm  Showering: You may shower, but should be cautious with your arm until your block as worn off  Ice:   You can ice the shoulder to reduce swelling and discomfort  Do not ice the shoulder more than 20 minutes at a time  Let the shoulder warm up before reapplication  Physical therapy:  You should be scheduled to start physical therapy tomorrow  If this is not yet set up, please call our office immediately  Follow-up visit:   You need to see the doctor 10-14 days following surgery for your first post-op visit  Common Concerns:   Bruising and/or swelling of the shoulder region are common after surgery  To relieve this discomfort it is best to ice the shoulder  You may also get swelling in the hand, which is also common after surgery  REMEMBER - these are only guidelines  If you have any questions or concerns please do not hesitate to call the office at any time [phone]

## 2023-11-24 ENCOUNTER — OFFICE VISIT (OUTPATIENT)
Dept: FAMILY MEDICINE CLINIC | Facility: CLINIC | Age: 48
End: 2023-11-24
Payer: COMMERCIAL

## 2023-11-24 ENCOUNTER — TELEPHONE (OUTPATIENT)
Age: 48
End: 2023-11-24

## 2023-11-24 VITALS
HEART RATE: 70 BPM | HEIGHT: 65 IN | TEMPERATURE: 97.4 F | DIASTOLIC BLOOD PRESSURE: 70 MMHG | OXYGEN SATURATION: 99 % | SYSTOLIC BLOOD PRESSURE: 120 MMHG | RESPIRATION RATE: 18 BRPM | BODY MASS INDEX: 25.99 KG/M2 | WEIGHT: 156 LBS

## 2023-11-24 DIAGNOSIS — J45.21 MILD INTERMITTENT ASTHMA WITH ACUTE EXACERBATION: Primary | ICD-10-CM

## 2023-11-24 DIAGNOSIS — J06.9 ACUTE URI: ICD-10-CM

## 2023-11-24 PROCEDURE — 99214 OFFICE O/P EST MOD 30 MIN: CPT | Performed by: FAMILY MEDICINE

## 2023-11-24 RX ORDER — NEBULIZER ACCESSORIES
KIT MISCELLANEOUS 4 TIMES DAILY
Qty: 1 KIT | Refills: 0 | Status: SHIPPED | OUTPATIENT
Start: 2023-11-24

## 2023-11-24 RX ORDER — PREDNISONE 20 MG/1
40 TABLET ORAL DAILY
Qty: 10 TABLET | Refills: 0 | Status: SHIPPED | OUTPATIENT
Start: 2023-11-24 | End: 2023-11-29

## 2023-11-24 RX ORDER — ALBUTEROL SULFATE 2.5 MG/3ML
2.5 SOLUTION RESPIRATORY (INHALATION) EVERY 6 HOURS PRN
Qty: 180 ML | Refills: 0 | Status: SHIPPED | OUTPATIENT
Start: 2023-11-24

## 2023-11-24 RX ORDER — AZITHROMYCIN 250 MG/1
TABLET, FILM COATED ORAL
Qty: 6 TABLET | Refills: 0 | Status: SHIPPED | OUTPATIENT
Start: 2023-11-24 | End: 2023-11-29

## 2023-11-24 NOTE — PROGRESS NOTES
Name: Jaxon Fox      : 1975      MRN: [id number]  Encounter Provider: Lorena Barlow MD  Encounter Date: 2023   Encounter department: [address] [place] [address]     1. Mild intermittent asthma with acute exacerbation  -     predniSONE 20 mg tablet; Take 2 tablets (40 mg total) by mouth daily for 5 days  -     Respiratory Therapy Supplies (Nebulizer/Tubing/Mouthpiece) KIT; Use 4 (four) times a day  -     albuterol (2.5 mg/3 mL) 0.083 % nebulizer solution; Take 3 mL (2.5 mg total) by nebulization every 6 (six) hours as needed for wheezing or shortness of breath    2. Acute URI  -     azithromycin (Zithromax) 250 mg tablet; Take 2 tablets (500 mg total) by mouth daily for 1 day, THEN 1 tablet (250 mg total) daily for 4 days. O2 sat is 99% in office today. Advised to start neb treatments and steroid course along with antibiotic. Aware to go to the ER if symptoms worsen. Depression Screening and Follow-up Plan: Patient was screened for depression during today's encounter. They screened negative with a PHQ-2 score of 0. Subjective      Shortness of Breath  Episode onset: 3 days. The problem occurs constantly. The problem has been gradually worsening since onset. Associated symptoms include chest pressure, dizziness, fatigue, hoarseness of voice, a sore throat and wheezing. Pertinent negatives include no chest pain, coughing, leg swelling, orthopnea, palpitations, rhinorrhea, stridor or sweats. Treatments tried: zycam, dayquil, albuterol. The treatment provided no relief. Her past medical history is significant for asthma. There is no history of allergies, anxiety/panic attacks, bronchiolitis, congenital heart disease, DVT, GERD, PE, spontaneous pneumothorax or tobacco use. Review of Systems   Constitutional:  Positive for fatigue. HENT:  Positive for hoarse voice and sore throat. Negative for rhinorrhea.     Respiratory:  Positive for shortness of breath and wheezing. Negative for cough and stridor. Cardiovascular:  Negative for chest pain, palpitations, orthopnea and leg swelling. Neurological:  Positive for dizziness. Current Outpatient Medications on File Prior to Visit   Medication Sig    albuterol (PROVENTIL HFA,VENTOLIN HFA) 90 mcg/act inhaler Inhale 2 puffs every 6 hours as needed for wheezing    Dulera 100-5 MCG/ACT inhaler INHALE 2 PUFFS 2 (TWO) TIMES A DAY RINSE MOUTH AFTER USE. (Patient taking differently: 2 (two) times a day as needed)    EPINEPHrine (EPIPEN) 0.3 mg/0.3 mL SOAJ INJECT 0.3 ML (0.3 MG TOTAL) INTO A MUSCLE ONCE FOR 1 DOSE    levocetirizine (XYZAL) 5 MG tablet Take 1 tablet (5 mg total) by mouth daily    miSOPROStol (Cytotec) 200 mcg tablet Place inside vagina at bedtime, night before IUD insertion. triamcinolone (KENALOG) 0.1 % oral topical paste APPLY 2 APPLICATION. TOPICALLY 2 (TWO) TIMES A DAY    ZOLMitriptan (ZOMIG) 5 MG tablet TAKE 1 TABLET BY MOUTH EVERY DAY AS NEEDED FOR MIGRAINE       Objective     /70   Pulse 70   Temp (!) 97.4 °F (36.3 °C)   Resp 18   Ht 5' 5" (1.651 m)   Wt 70.8 kg (156 lb)   SpO2 99%   BMI 25.96 kg/m²     Physical Exam  Constitutional:       General: She is not in acute distress. Appearance: She is well-developed. She is not diaphoretic. HENT:      Head: Normocephalic and atraumatic. Right Ear: Tympanic membrane, ear canal and external ear normal. There is no impacted cerumen. Left Ear: Tympanic membrane, ear canal and external ear normal. There is no impacted cerumen. Nose: Nose normal. No congestion or rhinorrhea. Mouth/Throat:      Mouth: Mucous membranes are moist.      Pharynx: Oropharynx is clear. No oropharyngeal exudate or posterior oropharyngeal erythema. Eyes:      General: No scleral icterus. Right eye: No discharge. Left eye: No discharge.       Conjunctiva/sclera: Conjunctivae normal.   Cardiovascular:      Rate and Rhythm: Normal rate and regular rhythm. Heart sounds: Normal heart sounds. No murmur heard. No friction rub. No gallop. Pulmonary:      Effort: Pulmonary effort is normal. No respiratory distress. Breath sounds: Normal breath sounds. No wheezing or rales. Chest:      Chest wall: No tenderness. Musculoskeletal:         General: No deformity. Normal range of motion. Cervical back: Normal range of motion and neck supple. Skin:     General: Skin is warm and dry. Neurological:      Mental Status: She is alert and oriented to person, place, and time. Psychiatric:         Behavior: Behavior normal.         Thought Content:  Thought content normal.         Judgment: Judgment normal.       Homero Wheatley MD

## 2023-11-24 NOTE — TELEPHONE ENCOUNTER
She may have to check at a medical supply store, at another pharmacy or she can purchase a machine online on [facility].

## 2023-11-24 NOTE — TELEPHONE ENCOUNTER
Pt went to  her medication at [facility] and they do not have the nebulizer there. Pt doesn't know where she should try to get it. Pt said the doctor told her to call back if [facility] doesn't carry them. Thank you.

## 2023-12-08 ENCOUNTER — OFFICE VISIT (OUTPATIENT)
Dept: FAMILY MEDICINE CLINIC | Facility: CLINIC | Age: 48
End: 2023-12-08
Payer: COMMERCIAL

## 2023-12-08 ENCOUNTER — TELEPHONE (OUTPATIENT)
Age: 48
End: 2023-12-08

## 2023-12-08 VITALS
SYSTOLIC BLOOD PRESSURE: 118 MMHG | TEMPERATURE: 98 F | HEART RATE: 60 BPM | DIASTOLIC BLOOD PRESSURE: 78 MMHG | RESPIRATION RATE: 18 BRPM | HEIGHT: 65 IN | WEIGHT: 156 LBS | BODY MASS INDEX: 25.99 KG/M2

## 2023-12-08 DIAGNOSIS — J01.00 ACUTE NON-RECURRENT MAXILLARY SINUSITIS: Primary | ICD-10-CM

## 2023-12-08 DIAGNOSIS — J45.30 MILD PERSISTENT ASTHMA, UNSPECIFIED WHETHER COMPLICATED: ICD-10-CM

## 2023-12-08 PROCEDURE — 99213 OFFICE O/P EST LOW 20 MIN: CPT | Performed by: NURSE PRACTITIONER

## 2023-12-08 RX ORDER — CEFDINIR 300 MG/1
300 CAPSULE ORAL EVERY 12 HOURS SCHEDULED
Qty: 20 CAPSULE | Refills: 0 | Status: SHIPPED | OUTPATIENT
Start: 2023-12-08 | End: 2023-12-18

## 2023-12-08 RX ORDER — PREDNISONE 10 MG/1
TABLET ORAL
Qty: 40 TABLET | Refills: 0 | Status: SHIPPED | OUTPATIENT
Start: 2023-12-08 | End: 2023-12-24

## 2023-12-08 RX ORDER — BENZONATATE 200 MG/1
200 CAPSULE ORAL 3 TIMES DAILY PRN
Qty: 20 CAPSULE | Refills: 0 | Status: SHIPPED | OUTPATIENT
Start: 2023-12-08

## 2023-12-08 NOTE — TELEPHONE ENCOUNTER
Pt has an appointment today with Dr. Alexys Durant pt wanted to be seen sooner offered the pt an appointment that was for Monday I misread the appointment/ are system to call back is down so I am unable to give the pt a call but to inform her that I misread the appointment time, but that she is still schedule for today for a visit at 11:30 and not 11 please  advise thank you.

## 2023-12-08 NOTE — PROGRESS NOTES
Assessment/Plan:    Will treat as below. Continue nebulizers/symptomatic treatment  F/u as needed    1. Acute non-recurrent maxillary sinusitis  -     cefdinir (OMNICEF) 300 mg capsule; Take 1 capsule (300 mg total) by mouth every 12 (twelve) hours for 10 days  -     predniSONE 10 mg tablet; 4 pills daily x 4 days, 3 pills days x 4 days, 2 pills daily x 4 days, 1 pill daily x 4 days Take with food    2. Mild persistent asthma, unspecified whether complicated  -     predniSONE 10 mg tablet; 4 pills daily x 4 days, 3 pills days x 4 days, 2 pills daily x 4 days, 1 pill daily x 4 days Take with food  -     benzonatate (TESSALON) 200 MG capsule; Take 1 capsule (200 mg total) by mouth 3 (three) times a day as needed for cough            There are no Patient Instructions on file for this visit. Return if symptoms worsen or fail to improve. Subjective:      Patient ID: Enio Cuadra is a 51 y.o. female. Chief Complaint   Patient presents with   • Nasal Congestion     Pt was here 2 weeks ago and perez not feel any better- gets SOB easily when she is moving around [facility]       Here today for follow up, has been sick for the past couple of weeks with URI Symptoms. Was prescribed a [employer] for sinusitis and felt progressively worse while on it. Has been feeling better recently, but still has a lot of pressure in her sinuses and increasing SOB with exertion and with talking. She uses her peak flow meter and has not been at her baseline. Using her nebulizer. Shortness of Breath  The current episode started 1 to 4 weeks ago. The problem occurs constantly. The problem is unchanged. Associated symptoms include rhinorrhea. Pertinent negatives include no chest pain, coughing, fatigue, leg swelling, orthopnea, sore throat or wheezing. The symptoms are aggravated by URIs, any activity and eating.        The following portions of the patient's history were reviewed and updated as appropriate: allergies, current medications, past family history, past medical history, past social history, past surgical history and problem list.    Review of Systems   Constitutional:  Negative for chills, fatigue and fever. HENT:  Positive for rhinorrhea. Negative for congestion, ear pain, postnasal drip, sinus pressure and sore throat. Respiratory:  Positive for shortness of breath. Negative for cough and wheezing. Cardiovascular:  Negative for chest pain, orthopnea and leg swelling. Gastrointestinal:  Negative for abdominal pain, diarrhea, nausea and vomiting. Musculoskeletal:  Negative for arthralgias and neck pain. Skin:  Negative for rash. Neurological:  Positive for headaches. Current Outpatient Medications   Medication Sig Dispense Refill   • albuterol (2.5 mg/3 mL) 0.083 % nebulizer solution Take 3 mL (2.5 mg total) by nebulization every 6 (six) hours as needed for wheezing or shortness of breath 180 mL 0   • albuterol (PROVENTIL HFA,VENTOLIN HFA) 90 mcg/act inhaler Inhale 2 puffs every 6 hours as needed for wheezing 18 g 1   • benzonatate (TESSALON) 200 MG capsule Take 1 capsule (200 mg total) by mouth 3 (three) times a day as needed for cough 20 capsule 0   • cefdinir (OMNICEF) 300 mg capsule Take 1 capsule (300 mg total) by mouth every 12 (twelve) hours for 10 days 20 capsule 0   • Dulera 100-5 MCG/ACT inhaler INHALE 2 PUFFS 2 (TWO) TIMES A DAY RINSE MOUTH AFTER USE. (Patient taking differently: 2 (two) times a day as needed) 39 Inhaler 1   • EPINEPHrine (EPIPEN) 0.3 mg/0.3 mL SOAJ INJECT 0.3 ML (0.3 MG TOTAL) INTO A MUSCLE ONCE FOR 1 DOSE 2 each 1   • levocetirizine (XYZAL) 5 MG tablet Take 1 tablet (5 mg total) by mouth daily 90 tablet 1   • miSOPROStol (Cytotec) 200 mcg tablet Place inside vagina at bedtime, night before IUD insertion.  2 tablet 0   • predniSONE 10 mg tablet 4 pills daily x 4 days, 3 pills days x 4 days, 2 pills daily x 4 days, 1 pill daily x 4 days Take with food 40 tablet 0   • Respiratory Therapy Supplies (Nebulizer/Tubing/Mouthpiece) KIT Use 4 (four) times a day 1 kit 0   • ZOLMitriptan (ZOMIG) 5 MG tablet TAKE 1 TABLET BY MOUTH EVERY DAY AS NEEDED FOR MIGRAINE 6 tablet 3   • triamcinolone (KENALOG) 0.1 % oral topical paste APPLY 2 APPLICATION. TOPICALLY 2 (TWO) TIMES A DAY (Patient not taking: Reported on 12/8/2023) 10 g 0     No current facility-administered medications for this visit. Objective:    /78   Pulse 60   Temp 98 °F (36.7 °C) (Temporal)   Resp 18   Ht 5' 5" (1.651 m)   Wt 70.8 kg (156 lb)   BMI 25.96 kg/m²        Physical Exam  Vitals and nursing note reviewed. Constitutional:       Appearance: Normal appearance. She is well-developed. HENT:      Right Ear: Tympanic membrane normal.      Left Ear: Tympanic membrane normal.      Nose: Nose normal.      Mouth/Throat:      Pharynx: Oropharynx is clear. Eyes:      Conjunctiva/sclera: Conjunctivae normal.   Cardiovascular:      Rate and Rhythm: Normal rate and regular rhythm. Pulses: Normal pulses. Heart sounds: Normal heart sounds. No murmur heard. Pulmonary:      Effort: Pulmonary effort is normal.      Breath sounds: Normal breath sounds. Lymphadenopathy:      Cervical: No cervical adenopathy. Skin:     General: Skin is warm and dry. Neurological:      Mental Status: She is alert. Psychiatric:         Mood and Affect: Mood normal.         Behavior: Behavior normal.                AURELIA Villarreal  Answers submitted by the patient for this visit:  Shortness of Breath Questionnaire (Submitted on 12/7/2023)  Chief Complaint: Shortness of breath  Chronicity: recurrent  claudication: No  coryza: Yes  leg pain: No  PND: No  sputum production: No  swollen glands: No  syncope:  No

## 2023-12-17 ENCOUNTER — PATIENT MESSAGE (OUTPATIENT)
Dept: FAMILY MEDICINE CLINIC | Facility: CLINIC | Age: 48
End: 2023-12-17

## 2023-12-17 DIAGNOSIS — J45.30 MILD PERSISTENT ASTHMA, UNSPECIFIED WHETHER COMPLICATED: ICD-10-CM

## 2023-12-21 ENCOUNTER — PATIENT MESSAGE (OUTPATIENT)
Dept: FAMILY MEDICINE CLINIC | Facility: CLINIC | Age: 48
End: 2023-12-21

## 2023-12-21 RX ORDER — MOMETASONE FUROATE AND FORMOTEROL FUMARATE DIHYDRATE 100; 5 UG/1; UG/1
2 AEROSOL RESPIRATORY (INHALATION) 2 TIMES DAILY
Qty: 13 G | Refills: 3 | Status: SHIPPED | OUTPATIENT
Start: 2023-12-21

## 2024-01-04 ENCOUNTER — APPOINTMENT (OUTPATIENT)
Dept: PHYSICAL THERAPY | Facility: CLINIC | Age: 49
End: 2024-01-04
Payer: COMMERCIAL

## 2024-01-09 ENCOUNTER — OFFICE VISIT (OUTPATIENT)
Dept: PHYSICAL THERAPY | Facility: CLINIC | Age: 49
End: 2024-01-09
Payer: COMMERCIAL

## 2024-01-09 DIAGNOSIS — G89.29 CHRONIC RIGHT SHOULDER PAIN: Primary | ICD-10-CM

## 2024-01-09 DIAGNOSIS — M25.511 CHRONIC RIGHT SHOULDER PAIN: Primary | ICD-10-CM

## 2024-01-09 PROCEDURE — 97161 PT EVAL LOW COMPLEX 20 MIN: CPT

## 2024-01-09 NOTE — PROGRESS NOTES
PT Evaluation     Today's date: 2024  Patient name: Shannan Deleon  : 1975  MRN: [id number]  Referring provider: Self, Referral  Dx:   Encounter Diagnosis     ICD-10-CM    1. Chronic right shoulder pain  M25.511     G89.29             Assessment  Assessment details: Patient is a 48 y.o. Female who presents to skilled outpatient PT with referring diagnosis of chronic right shoulder pain. Primary movement impairment diagnosis of strength and endurance deficits resulting in pathoanatomical symptoms of R shoulder and scapular dysfunction. The aforementioned impairments have limited the patient's ability to lift objects of weight away from midline, perform work related tasks. Patient education performed during today's session included: HEP as noted on flow sheet, nature of shoulder injury, and modalities to help ease pain levels. Patient verbalized understanding of POC.    Impairments: Abnormal or restricted ROM, Impaired physical strength, and Pain with function  Understanding of Dx/Px/POC: Excellent  Prognosis: Excellent      Plan  Patient would benefit from: PT Eval and Skilled PT  Planned modality interventions: Dry needling, Biofeedback, Cryotherapy, TENS, Thermotherapy: Hydrocollator Packs, and Traction  Planned therapy interventions: Abdominal trunk stabilization, ADL training, Balance, Balance/WB training, Breathing training, Functional ROM exercises, Gait training, HEP, Joint mobilization, Manual therapy, Farias taping, Neuromuscular re-education, Patient education, Postural training, Strengthening, Stretching, Therapeutic activities, Therapeutic exercises, Therapeutic training, Activity modification, and Work reintegration  Frequency: 2x/wk  Duration in weeks: 6  Plan of Care beginning date: 24  Plan of Care expiration date: 6 weeks - 2024  Treatment plan discussed with: Patient       Goals  Short Term Goals (4 weeks):    - Patient will be independent in basic HEP 2-3 weeks  - Patient  "will have 0/10 pain at rest  - Patient will demonstrate >1/3 improvement in MMT grade as applicable  - Patient functional goal : Patient will lift pain brushes with no increased pain.     Long Term Goals (8 weeks):  - Patient will be independent in a comprehensive home exercise program  - Patient FOTO score will improve by 10 points.   - Patient will self-report >75% improvement in function      Subjective    History of Present Illness  - Mechanism of injury: Patient had a RTC repair in , but notes weakness of her R UE. She continues to struggle with doing a pushup, reaching out of GALE with weight. She has full ROM but continues to have weakness. She works out several days a week, where she is using dumbbells. 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     - Functional limitations: strength of R UE    - Patient goals: \"Want to strength shoulder and even out R and L side.\"     Pain  - Current pain ratin/10  - At best pain ratin/10  - At worst pain ratin/10  - Location: R shoulder   - Alleviating factors: ice         Objective   UE MMT    L Shoulder Flexion: 5/5 R Shoulder Flexion: 4/5   L Shoulder Extension: 5/5 R Shoulder Extension: 5/5   L Shoulder Abduction: 5/5 R Shoulder Abduction: 4/5   L Shoulder  IR: 5/5 R Shoulder  IR: 5/5   L Shoulder  ER: 5/5 R Shoulder  ER: 4/5   L Elbow Extension: 5/5 R Elbow Extension: 5/5   L Elbow Flexion: 5/5 R Elbow Flexion: 5/5   L Wrist Flexion: 5/5 R Wrist Flexion: 5/5   L Wrist Extension: 5/5 R Wrist Extension: 5/5         Sensation  - Light touch: intact     Palpation   -TTP R biceps, R supraspinatus     Joint mobility assessment L R   SC WNL WNL   AC WNL WNL   GH WNL WNL   Thoracic spine  WNL WNL   1st rib  WNL WNL         Shoulder AROM L R   Flexion 180 deg 180 deg   Extension 80 deg 80 deg   ER 90 deg 90 deg   IR 90 deg 90 deg   Abduction 180 deg 180 deg   Functional IR BTB T10 T10   Functional ER BTH T1 T1     Shoulder " PROM L R   Flexion 180 deg 180 deg   Extension 90 deg 90 deg   ER 90 deg 90 deg   IR 90 deg 90 deg   Abduction 180 deg 180 deg         Special Testing L R   Hawkin's Lit  negative negative   Speed's  negative negative   Drop arm test negative Negative    Painful arc  negative negative   Belly press test negative negative   CRLF  negative negative   Cross body test negative negative           Precautions: R RTC and biceps tenodesis 12/8/2022, S/P R RAHUL 3/27/23    Past Medical History:   Diagnosis Date    Anesthesia complication     pt can still hear, describe whats going on in the room, and feel pressure last 2 surgeries.    Asthma     Candidiasis of mouth     LA....2/26/15    R.....2/25/16      Exposure to viral disease     LA....2/25/16   R.....3/24/16      Otalgia 01/23/2012    LA....1/23/12    R....2/23/16         Insurance:  AMA/CMS Eval/ Re-eval POC expires FOTO Auth #/ Referral # Total   Visits  Start date  Expiration date Extension  Visit limitation?  PT only or  PT+OT? Co-Insurance   BC 1/9 2/20  Auth submitted on 1/9/24     BOMN  PT $0 Co-pay                                                                 AUTH #:  Date 1/9              Visits  Authed:  Used                Remaining                   Date 1/9/2024        Visit Number IE                 Manual         GH mobs          AC mobs          Scap mobs          TPR                  Neuro Re-ed         Ball on wall  Circles x30 each direction                                                     Thera Ex         Pulleys          Shoulder PROM         TB shoulder ext         TB row          TB IR/ER          Wall slides          Prone I, Y, T X10 each         No money at wall X10 GTB                 Thera Activity         Patient education 10'        Posture education                                                      Modalities         Ice

## 2024-01-23 ENCOUNTER — OFFICE VISIT (OUTPATIENT)
Dept: PHYSICAL THERAPY | Facility: CLINIC | Age: 49
End: 2024-01-23
Payer: COMMERCIAL

## 2024-01-23 DIAGNOSIS — G89.29 CHRONIC RIGHT SHOULDER PAIN: Primary | ICD-10-CM

## 2024-01-23 DIAGNOSIS — M25.511 CHRONIC RIGHT SHOULDER PAIN: Primary | ICD-10-CM

## 2024-01-23 PROCEDURE — 97110 THERAPEUTIC EXERCISES: CPT

## 2024-01-23 NOTE — PROGRESS NOTES
Daily Note     Today's date: 2024  Patient name: Shannan Deleon  : 1975  MRN: [id number]  Referring provider: Self, Referral  Dx:   Encounter Diagnosis     ICD-10-CM    1. Chronic right shoulder pain  M25.511     G89.29           Start Time: 0845  Stop Time: 09  Total time in clinic (min): 45 minutes    Subjective: Patient worked out yesterday and was able to perform shoulder taps in a plank position.       Objective: See treatment diary below      Assessment: Tolerated treatment well. Patient was highly challenged with performing D2 flexion due to weakness. Scapular mechanics improved today, with moderate weakness noted. Will continue to progress as tolerated. Patient would benefit from continued PT      Plan: Continue per plan of care.      Precautions: R RTC and biceps tenodesis 2022, S/P R RAHUL 3/27/23    Past Medical History:   Diagnosis Date    Anesthesia complication     pt can still hear, describe whats going on in the room, and feel pressure last 2 surgeries.    Asthma     Candidiasis of mouth     LA....2/26/15    R.....16      Exposure to viral disease     LA....16   R.....3/24/16      Otalgia 2012    LA....12    R....16         Insurance:  AMA/CMS Eval/ Re-eval POC expires FOTO Auth #/ Referral # Total   Visits  Start date  Expiration date Extension  Visit limitation?  PT only or  PT+OT? Co-Insurance   BC   Auth submitted on 24     BOMN  PT $0 Co-pay                                                                 AUTH #:  Date               Visits  Authed:  Used                Remaining                   Date 2024       Visit Number IE 2                Manual         GH mobs          AC mobs          Scap mobs          TPR                  Neuro Re-ed         Ball on wall  Circles x30 each direction                                                     Thera Ex         Pulleys   Chest press 2x10 30#        Shoulder PROM  Lat pull  down 2x10 30#        TB shoulder ext  Shoulder press overhead 2x10 10#        TB row   Row 2x10 30#        TB IR/ER   Tricep extension CC 10#        Wall slides   90/90 walkouts 5# x10 B        Prone I, Y, T X10 each  D2 flexion CC 5# 2x10 B        No money at wall X10 GTB                 Thera Activity         Patient education 10'        Posture education                                                      Modalities         Ice

## 2024-05-03 ENCOUNTER — HOSPITAL ENCOUNTER (OUTPATIENT)
Dept: RADIOLOGY | Facility: HOSPITAL | Age: 49
Discharge: HOME | End: 2024-05-03
Attending: INTERNAL MEDICINE
Payer: COMMERCIAL

## 2024-05-03 DIAGNOSIS — Z12.31 SCREENING MAMMOGRAM FOR HIGH-RISK PATIENT: ICD-10-CM

## 2024-05-03 PROCEDURE — 77063 BREAST TOMOSYNTHESIS BI: CPT

## 2024-05-03 PROCEDURE — 77067 SCR MAMMO BI INCL CAD: CPT

## 2024-05-11 ENCOUNTER — APPOINTMENT (OUTPATIENT)
Dept: RADIOLOGY | Facility: CLINIC | Age: 49
End: 2024-05-11
Payer: COMMERCIAL

## 2024-05-11 ENCOUNTER — OFFICE VISIT (OUTPATIENT)
Dept: URGENT CARE | Facility: CLINIC | Age: 49
End: 2024-05-11
Payer: COMMERCIAL

## 2024-05-11 VITALS
OXYGEN SATURATION: 97 % | RESPIRATION RATE: 18 BRPM | TEMPERATURE: 98.3 F | HEIGHT: 65 IN | HEART RATE: 84 BPM | SYSTOLIC BLOOD PRESSURE: 136 MMHG | WEIGHT: 162 LBS | DIASTOLIC BLOOD PRESSURE: 84 MMHG | BODY MASS INDEX: 26.99 KG/M2

## 2024-05-11 DIAGNOSIS — R09.89 CHEST CONGESTION: Primary | ICD-10-CM

## 2024-05-11 PROCEDURE — 71046 X-RAY EXAM CHEST 2 VIEWS: CPT

## 2024-05-11 PROCEDURE — 99213 OFFICE O/P EST LOW 20 MIN: CPT | Performed by: PHYSICIAN ASSISTANT

## 2024-05-11 RX ORDER — PREDNISONE 10 MG/1
40 TABLET ORAL DAILY
Qty: 16 TABLET | Refills: 0 | Status: CANCELLED | OUTPATIENT
Start: 2024-05-11 | End: 2024-05-15

## 2024-05-11 RX ORDER — AZITHROMYCIN 250 MG/1
TABLET, FILM COATED ORAL
Qty: 6 TABLET | Refills: 0 | Status: SHIPPED | OUTPATIENT
Start: 2024-05-11 | End: 2024-05-15

## 2024-05-11 RX ORDER — BENZONATATE 100 MG/1
100 CAPSULE ORAL 3 TIMES DAILY PRN
Qty: 30 CAPSULE | Refills: 0 | Status: SHIPPED | OUTPATIENT
Start: 2024-05-11

## 2024-05-11 NOTE — PROGRESS NOTES
[facility]        NAME: Shannan Deleon is a 49 y.o. female  : 1975    MRN: [id number]  DATE: May 11, 2024  TIME: 12:23 PM    Assessment and Plan   Chest congestion [R09.89]  1. Chest congestion  XR chest pa & lateral    benzonatate (TESSALON PERLES) 100 mg capsule    XR chest pa & lateral    azithromycin (ZITHROMAX) 250 mg tablet        Normal lung sounds.  Recommending prednisone given patient's history of asthma.  She states she does not react well with prednisone and would not like to take it.  Will then do azithromycin and Tessalon.    Patient Instructions   There are no Patient Instructions on file for this visit.      Follow up with PCP in 3-5 days.  Proceed to  ER if symptoms worsen.    Chief Complaint     Chief Complaint   Patient presents with    Cold Like Symptoms     Pt states that she has chest congestion, cough for about 2 weeks. Pt is taking mucinex, dayquill, Nightquill.         History of Present Illness       The patient is a 49-year-old female presenting today for 2 weeks of a cough and chest congestion.  Has tried Mucinex, DayQuil and NyQuil. Reports going to [place] in 5 days. Hx of asthma and does have an inhaler.        Review of Systems   Review of Systems   Constitutional:  Negative for activity change, appetite change, chills, fatigue and fever.   HENT:  Negative for congestion, rhinorrhea, sinus pressure, sinus pain and sore throat.    Respiratory:  Positive for cough. Negative for chest tightness (chest congestion) and shortness of breath.    Cardiovascular:  Negative for chest pain and palpitations.   Gastrointestinal:  Negative for diarrhea, nausea and vomiting.   Musculoskeletal:  Negative for arthralgias and myalgias.   Skin:  Negative for color change and pallor.   Neurological:  Negative for headaches.         Current Medications       Current Outpatient Medications:     albuterol (2.5 mg/3 mL) 0.083 % nebulizer solution, Take 3 mL (2.5 mg total) by nebulization  every 6 (six) hours as needed for wheezing or shortness of breath, Disp: 180 mL, Rfl: 0    albuterol (PROVENTIL HFA,VENTOLIN HFA) 90 mcg/act inhaler, Inhale 2 puffs every 6 hours as needed for wheezing, Disp: 18 g, Rfl: 1    azithromycin (ZITHROMAX) 250 mg tablet, Take 2 tablets today then 1 tablet daily x 4 days, Disp: 6 tablet, Rfl: 0    benzonatate (TESSALON PERLES) 100 mg capsule, Take 1 capsule (100 mg total) by mouth 3 (three) times a day as needed for cough, Disp: 30 capsule, Rfl: 0    levocetirizine (XYZAL) 5 MG tablet, Take 1 tablet (5 mg total) by mouth daily, Disp: 90 tablet, Rfl: 1    mometasone-formoterol (Dulera) 100-5 MCG/ACT inhaler, Inhale 2 puffs 2 (two) times a day Rinse mouth after use., Disp: 13 g, Rfl: 3    Respiratory Therapy Supplies (Nebulizer/Tubing/Mouthpiece) KIT, Use 4 (four) times a day, Disp: 1 kit, Rfl: 0    ZOLMitriptan (ZOMIG) 5 MG tablet, TAKE 1 TABLET BY MOUTH EVERY DAY AS NEEDED FOR MIGRAINE, Disp: 6 tablet, Rfl: 3    benzonatate (TESSALON) 200 MG capsule, Take 1 capsule (200 mg total) by mouth 3 (three) times a day as needed for cough (Patient not taking: Reported on 5/11/2024), Disp: 20 capsule, Rfl: 0    EPINEPHrine (EPIPEN) 0.3 mg/0.3 mL SOAJ, INJECT 0.3 ML (0.3 MG TOTAL) INTO A MUSCLE ONCE FOR 1 DOSE, Disp: 2 each, Rfl: 1    miSOPROStol (Cytotec) 200 mcg tablet, Place inside vagina at bedtime, night before IUD insertion. (Patient not taking: Reported on 5/11/2024), Disp: 2 tablet, Rfl: 0    triamcinolone (KENALOG) 0.1 % oral topical paste, APPLY 2 APPLICATION. TOPICALLY 2 (TWO) TIMES A DAY (Patient not taking: Reported on 12/8/2023), Disp: 10 g, Rfl: 0    Current Allergies     Allergies as of 05/11/2024 - Reviewed 05/11/2024   Allergen Reaction Noted    Treenut [nuts - food allergy] Anaphylaxis 09/14/2022            The following portions of the patient's history were reviewed and updated as appropriate: allergies, current medications, past family history, past medical  "history, past social history, past surgical history and problem list.     Past Medical History:   Diagnosis Date    Anesthesia complication     pt can still hear, describe whats going on in the room, and feel pressure last 2 surgeries.    Asthma     Candidiasis of mouth     LA....2/26/15    R.....16      Exposure to viral disease     LA....16   R.....3/24/16      Otalgia 2012    LA....12    R....16         Past Surgical History:   Procedure Laterality Date     SECTION      DILATION AND EVACUATION      NASAL FRACTURE SURGERY      HI MNPJ W/ANES SHOULDER JT APPL FIXATION APPARATUS Right 2023    Procedure: Right Shoulder Manipulation Under Anesthesia;  Surgeon: Jose Ewing MD;  Location: [facility];  Service: Orthopedics    HI SURGICAL ARTHROSCOPY SHOULDER W/ROTATOR CUFF RPR Right 2022    Procedure: Shoulder Arthroscopy with Rotator Cuff Repair, Biceps tenodesis, and subacromial decompression;  Surgeon: Jose Ewing MD;  Location: [facility];  Service: Orthopedics    SHOULDER SURGERY      TONSILLECTOMY      WRIST GANGLION EXCISION Left        Family History   Problem Relation Age of Onset    Colon cancer Mother 60    Hypertension Mother     Depression Mother     COPD Mother     Cancer Mother     Rheum arthritis Father     Hyperlipidemia Father     Depression Father     Brain cancer Maternal Grandmother     Diabetes type II Maternal Grandfather     Diabetes Maternal Grandfather     Lung cancer Paternal Uncle     No Known Problems Maternal Uncle          Medications have been verified.        Objective   /84   Pulse 84   Temp 98.3 °F (36.8 °C)   Resp 18   Ht 5' 5\" (1.651 m)   Wt 73.5 kg (162 lb)   SpO2 97%   BMI 26.96 kg/m²        Physical Exam     Physical Exam  Vitals and nursing note reviewed.   Constitutional:       General: She is not in acute distress.     Appearance: Normal appearance. She is well-developed and normal " weight. She is not ill-appearing, toxic-appearing or diaphoretic.   HENT:      Head: Normocephalic and atraumatic.      Right Ear: Tympanic membrane, ear canal and external ear normal. No drainage, swelling or tenderness. No middle ear effusion. There is no impacted cerumen. Tympanic membrane is not erythematous.      Left Ear: Tympanic membrane, ear canal and external ear normal. No drainage, swelling or tenderness.  No middle ear effusion. There is no impacted cerumen. Tympanic membrane is not erythematous.      Nose: Nose normal. No congestion or rhinorrhea.      Mouth/Throat:      Mouth: Mucous membranes are moist. No oral lesions.      Pharynx: Oropharynx is clear. Uvula midline. No pharyngeal swelling, oropharyngeal exudate, posterior oropharyngeal erythema or uvula swelling.      Tonsils: No tonsillar exudate or tonsillar abscesses. 0 on the right. 0 on the left.   Eyes:      Extraocular Movements:      Right eye: Normal extraocular motion.      Left eye: Normal extraocular motion.      Conjunctiva/sclera: Conjunctivae normal.      Pupils: Pupils are equal, round, and reactive to light.   Cardiovascular:      Rate and Rhythm: Normal rate and regular rhythm.      Heart sounds: Normal heart sounds. No murmur heard.     No friction rub. No gallop.   Pulmonary:      Effort: Pulmonary effort is normal. No respiratory distress.      Breath sounds: Normal breath sounds. No stridor. No wheezing, rhonchi or rales.   Chest:      Chest wall: No tenderness.   Musculoskeletal:         General: Normal range of motion.   Skin:     General: Skin is warm and dry.      Capillary Refill: Capillary refill takes less than 2 seconds.   Neurological:      Mental Status: She is alert.

## 2024-08-06 DIAGNOSIS — T78.40XD ALLERGIC REACTION, SUBSEQUENT ENCOUNTER: ICD-10-CM

## 2024-08-06 RX ORDER — EPINEPHRINE 0.3 MG/.3ML
INJECTION SUBCUTANEOUS
Qty: 2 EACH | Refills: 1 | Status: SHIPPED | OUTPATIENT
Start: 2024-08-06

## 2024-08-08 ENCOUNTER — OFFICE VISIT (OUTPATIENT)
Age: 49
End: 2024-08-08
Payer: COMMERCIAL

## 2024-08-08 VITALS
RESPIRATION RATE: 17 BRPM | HEART RATE: 67 BPM | SYSTOLIC BLOOD PRESSURE: 112 MMHG | WEIGHT: 162 LBS | BODY MASS INDEX: 26.99 KG/M2 | DIASTOLIC BLOOD PRESSURE: 70 MMHG | HEIGHT: 65 IN

## 2024-08-08 DIAGNOSIS — M72.2 PLANTAR FASCIITIS: Primary | ICD-10-CM

## 2024-08-08 DIAGNOSIS — M79.672 LEFT FOOT PAIN: ICD-10-CM

## 2024-08-08 PROCEDURE — 99212 OFFICE O/P EST SF 10 MIN: CPT | Performed by: PODIATRIST

## 2024-08-08 PROCEDURE — 20550 NJX 1 TENDON SHEATH/LIGAMENT: CPT | Performed by: PODIATRIST

## 2024-08-08 RX ORDER — TRIAMCINOLONE ACETONIDE 40 MG/ML
20 INJECTION, SUSPENSION INTRA-ARTICULAR; INTRAMUSCULAR
Status: SHIPPED | OUTPATIENT
Start: 2024-08-08

## 2024-08-08 RX ORDER — MELOXICAM 15 MG/1
15 TABLET ORAL DAILY
Qty: 30 TABLET | Refills: 0 | Status: SHIPPED | OUTPATIENT
Start: 2024-08-08 | End: 2024-09-07

## 2024-08-08 RX ADMIN — TRIAMCINOLONE ACETONIDE 20 MG: 40 INJECTION, SUSPENSION INTRA-ARTICULAR; INTRAMUSCULAR at 09:00

## 2024-08-08 NOTE — PROGRESS NOTES
Assessment/Plan: Plantar fasciitis left foot.  Pain.    Plan.  Chart reviewed.  PCP notes reviewed.  Patient examined.  Trigger point ejections done.  Patient be placed on Mobic.  Stretch daily.  Return as needed    Foot/lower extremity injection    Performed by: Jose Redman DPM  Authorized by: Jose Redman DPM    Procedure:     Other Assisting Provider: No      Verbal consent obtained?: Yes      Risks and benefits: Risks, benefits and alternatives were discussed      Consent given by:  Patient    Time out: Immediately prior to the procedure a time out was called      Time out performed at:  8/8/2024 9:09 AM    Patient states understanding of procedure being performed: Yes      Patient identity confirmed:  Verbally with patient    Supporting Documentation:     Indications:  Pain and therapeutic    Procedure Details:    Prep: patient was prepped and draped in usual sterile fashion                Ethyl Chloride was applied      Needle size: 25 G G    Ultrasound Guidance: no      Approach:  Medial    Laterality:  Left    Cyst Aspiration/Injection: No      Location: aponeurosis      Aponeurosis Structures: Plantar fascia origin      Injection Information:       Medications:  20 mg triamcinolone acetonide 40 mg/mL    Patient tolerance:  Patient tolerated the procedure well with no immediate complications    Dressing: sterile dressing applied               Diagnoses and all orders for this visit:    Plantar fasciitis  -     meloxicam (MOBIC) 15 mg tablet; Take 1 tablet (15 mg total) by mouth daily    Left foot pain  -     meloxicam (MOBIC) 15 mg tablet; Take 1 tablet (15 mg total) by mouth daily          Subjective: Patient has return of pain in left heel.  No history of trauma.  She has pain upon rising.  Patient has history of Planter fasciitis.    Allergies   Allergen Reactions    Treenut [Nuts - Food Allergy] Anaphylaxis         Current Outpatient Medications:     meloxicam (MOBIC) 15 mg tablet, Take 1 tablet (15  mg total) by mouth daily, Disp: 30 tablet, Rfl: 0    albuterol (2.5 mg/3 mL) 0.083 % nebulizer solution, Take 3 mL (2.5 mg total) by nebulization every 6 (six) hours as needed for wheezing or shortness of breath, Disp: 180 mL, Rfl: 0    albuterol (PROVENTIL HFA,VENTOLIN HFA) 90 mcg/act inhaler, Inhale 2 puffs every 6 hours as needed for wheezing, Disp: 18 g, Rfl: 1    benzonatate (TESSALON PERLES) 100 mg capsule, Take 1 capsule (100 mg total) by mouth 3 (three) times a day as needed for cough, Disp: 30 capsule, Rfl: 0    benzonatate (TESSALON) 200 MG capsule, Take 1 capsule (200 mg total) by mouth 3 (three) times a day as needed for cough (Patient not taking: Reported on 5/11/2024), Disp: 20 capsule, Rfl: 0    EPINEPHrine (EPIPEN) 0.3 mg/0.3 mL SOAJ, INJECT 0.3 ML (0.3 MG TOTAL) INTO A MUSCLE ONCE FOR 1 DOSE (INSURANCE PREFERS MYLAN BRAND), Disp: 2 each, Rfl: 1    levocetirizine (XYZAL) 5 MG tablet, Take 1 tablet (5 mg total) by mouth daily, Disp: 90 tablet, Rfl: 1    miSOPROStol (Cytotec) 200 mcg tablet, Place inside vagina at bedtime, night before IUD insertion. (Patient not taking: Reported on 5/11/2024), Disp: 2 tablet, Rfl: 0    mometasone-formoterol (Dulera) 100-5 MCG/ACT inhaler, Inhale 2 puffs 2 (two) times a day Rinse mouth after use., Disp: 13 g, Rfl: 3    Respiratory Therapy Supplies (Nebulizer/Tubing/Mouthpiece) KIT, Use 4 (four) times a day, Disp: 1 kit, Rfl: 0    triamcinolone (KENALOG) 0.1 % oral topical paste, APPLY 2 APPLICATION. TOPICALLY 2 (TWO) TIMES A DAY (Patient not taking: Reported on 12/8/2023), Disp: 10 g, Rfl: 0    ZOLMitriptan (ZOMIG) 5 MG tablet, TAKE 1 TABLET BY MOUTH EVERY DAY AS NEEDED FOR MIGRAINE, Disp: 6 tablet, Rfl: 3    Patient Active Problem List   Diagnosis    Asthma, mild persistent    Allergic rhinitis    Common migraine without aura    Plantar fasciitis    Congenital pes planus of right foot    Congenital pes planus of left foot    Vaginal high risk HPV DNA test positive     Grade II hemorrhoids          Patient ID: Shannan Deleon is a 49 y.o. female.    HPI    The following portions of the patient's history were reviewed and updated as appropriate:     family history includes Brain cancer in her maternal grandmother; COPD in her mother; Cancer in her mother; Colon cancer (age of onset: 60) in her mother; Depression in her father and mother; Diabetes in her maternal grandfather; Diabetes type II in her maternal grandfather; Hyperlipidemia in her father; Hypertension in her mother; Lung cancer in her paternal uncle; No Known Problems in her maternal uncle; Rheum arthritis in her father.      reports that she quit smoking about 30 years ago. Her smoking use included cigarettes. She started smoking about 37 years ago. She has a 7 pack-year smoking history. She has never used smokeless tobacco. She reports current alcohol use of about 4.0 standard drinks of alcohol per week. She reports current drug use. Drug: Marijuana.    Vitals:    08/08/24 0900   BP: 112/70   Pulse: 67   Resp: 17       Review of Systems      Objective:  Patient's shoes and socks removed.   Foot ExamPhysical Exam      General  General Appearance: appears stated age and healthy   Orientation: alert and oriented to person, place, and time   Affect: appropriate   Gait: antalgic         Right Foot/Ankle      Inspection and Palpation  Ecchymosis: none  Tenderness: none   Swelling: none   Hammertoes: fifth toe  Hallux valgus: no  Hallux limitus: yes  Skin Exam: dry skin;      Neurovascular  Dorsalis pedis: 2+  Posterior tibial: 3+  Achilles reflex: 1+     Range of Motion     Passive  Ankle dorsiflexion: 5        Tests  PT Tinel's sign: negative    Too many toes: positive         Left Foot/Ankle       Inspection and Palpation  Ecchymosis: none  Tenderness: bony tenderness, plantar fascia and calcaneus tenderness   Swelling: plantar fascia   Hammertoes: fifth toe  Hallux valgus: no  Hallux limitus: yes  Skin Exam: dry skin;       Neurovascular  Dorsalis pedis: 2+  Posterior tibial: 3+  Achilles reflex: 1+     Range of Motion     Passive  Ankle dorsiflexion: 5        Tests  PT Tinel's sign: negative  Too many toes: positive         Physical Exam   Constitutional: She appears well-developed and well-nourished.   Cardiovascular: Normal rate and regular rhythm.    Pulses:       Dorsalis pedis pulses are 2+ on the right side, and 2+ on the left side.        Posterior tibial pulses are 3+ on the right side, and 3+ on the left side.   Musculoskeletal:        Left foot: There is bony tenderness.   Patient is maximally pronated in stance and gait.  There is pain with palpation   Of left plantar fascia.    X-ray.  Large plantar calcaneal spur noted left foot.   Right heel has small calcaneal spur.  Feet:   Right Foot:   Skin Integrity: Positive for dry skin.   Left Foot:   Skin Integrity: Positive for dry skin.   Neurological:   Reflex Scores:       Achilles reflexes are 1+ on the right side and 1+ on the left side.

## 2024-08-13 DIAGNOSIS — G43.009 MIGRAINE WITHOUT AURA AND WITHOUT STATUS MIGRAINOSUS, NOT INTRACTABLE: ICD-10-CM

## 2024-08-14 RX ORDER — ZOLMITRIPTAN 5 MG/1
5 TABLET, FILM COATED ORAL DAILY PRN
Qty: 6 TABLET | Refills: 0 | Status: SHIPPED | OUTPATIENT
Start: 2024-08-14

## 2024-08-28 DIAGNOSIS — M72.2 PLANTAR FASCIITIS: ICD-10-CM

## 2024-08-28 DIAGNOSIS — M79.672 LEFT FOOT PAIN: ICD-10-CM

## 2024-08-28 RX ORDER — MELOXICAM 15 MG/1
15 TABLET ORAL DAILY
Qty: 30 TABLET | Refills: 0 | Status: SHIPPED | OUTPATIENT
Start: 2024-08-28 | End: 2024-09-27

## 2024-11-15 DIAGNOSIS — G43.009 MIGRAINE WITHOUT AURA AND WITHOUT STATUS MIGRAINOSUS, NOT INTRACTABLE: ICD-10-CM

## 2024-11-17 RX ORDER — ZOLMITRIPTAN 5 MG/1
5 TABLET, FILM COATED ORAL DAILY PRN
Qty: 6 TABLET | Refills: 0 | Status: SHIPPED | OUTPATIENT
Start: 2024-11-17

## 2024-12-21 ENCOUNTER — OFFICE VISIT (OUTPATIENT)
Dept: URGENT CARE | Facility: CLINIC | Age: 49
End: 2024-12-21
Payer: COMMERCIAL

## 2024-12-21 VITALS
OXYGEN SATURATION: 100 % | SYSTOLIC BLOOD PRESSURE: 119 MMHG | TEMPERATURE: 97.8 F | RESPIRATION RATE: 14 BRPM | HEART RATE: 60 BPM | DIASTOLIC BLOOD PRESSURE: 76 MMHG

## 2024-12-21 DIAGNOSIS — R05.1 ACUTE COUGH: Primary | ICD-10-CM

## 2024-12-21 PROCEDURE — 99213 OFFICE O/P EST LOW 20 MIN: CPT | Performed by: PHYSICIAN ASSISTANT

## 2024-12-21 RX ORDER — BENZONATATE 100 MG/1
100 CAPSULE ORAL 3 TIMES DAILY PRN
Qty: 30 CAPSULE | Refills: 0 | Status: SHIPPED | OUTPATIENT
Start: 2024-12-21

## 2024-12-21 RX ORDER — AZITHROMYCIN 250 MG/1
TABLET, FILM COATED ORAL
Qty: 6 TABLET | Refills: 0 | Status: SHIPPED | OUTPATIENT
Start: 2024-12-21 | End: 2024-12-25

## 2024-12-21 RX ORDER — PREDNISONE 10 MG/1
40 TABLET ORAL DAILY
Qty: 16 TABLET | Refills: 0 | Status: SHIPPED | OUTPATIENT
Start: 2024-12-21 | End: 2024-12-25

## 2024-12-21 NOTE — PROGRESS NOTES
[facility]        NAME: Shannan Deleon is a 49 y.o. female  : 1975    MRN: [id number]  DATE: 2024  TIME: 9:38 AM    Assessment and Plan   Acute cough [R05.1]  1. Acute cough  azithromycin (ZITHROMAX) 250 mg tablet    predniSONE 10 mg tablet    benzonatate (TESSALON PERLES) 100 mg capsule        Normal exam.  No signs of bacterial infection.  Clear lung sounds.  However, given duration of symptoms I will put the patient on azithromycin, prednisone and Tessalon Perles.    Patient Instructions   There are no Patient Instructions on file for this visit.      Follow up with PCP in 3-5 days.  Proceed to  ER if symptoms worsen.    Chief Complaint     Chief Complaint   Patient presents with    Cold Like Symptoms     Body aches, chills, loss of appetite, loss of voice and coughing for 4 days.           History of Present Illness       The patient is a 49-year-old female with a past medical history of asthma, allergic rhinitis, and migraines without aura presenting today for possible upper respiratory infection.  She reports a sore throat, cough, loss of voice, loss of appetite myalgias and chills. Symptoms first began in September with fluid in her ears while on vacation. Then got URI symptoms. Never saw a doctor bc she was in [place]. Symptoms cleared on their own. Symptoms have been coming since then. Currently feels the worse.       Review of Systems   Review of Systems   Constitutional:  Positive for appetite change and chills. Negative for activity change, fatigue and fever.   HENT:  Positive for congestion, ear pain (R), sore throat and voice change. Negative for rhinorrhea, sinus pressure and sinus pain.    Eyes:  Negative for pain and visual disturbance.   Respiratory:  Positive for cough. Negative for chest tightness and shortness of breath.    Cardiovascular:  Negative for chest pain and palpitations.   Gastrointestinal:  Negative for abdominal pain, diarrhea, nausea and vomiting.    Genitourinary:  Negative for dysuria and hematuria.   Musculoskeletal:  Negative for arthralgias, back pain and myalgias.   Skin:  Negative for color change, pallor and rash.   Neurological:  Negative for seizures, syncope and headaches.   All other systems reviewed and are negative.        Current Medications       Current Outpatient Medications:     albuterol (2.5 mg/3 mL) 0.083 % nebulizer solution, Take 3 mL (2.5 mg total) by nebulization every 6 (six) hours as needed for wheezing or shortness of breath, Disp: 180 mL, Rfl: 0    albuterol (PROVENTIL HFA,VENTOLIN HFA) 90 mcg/act inhaler, Inhale 2 puffs every 6 hours as needed for wheezing, Disp: 18 g, Rfl: 1    azithromycin (ZITHROMAX) 250 mg tablet, Take 2 tablets today then 1 tablet daily x 4 days, Disp: 6 tablet, Rfl: 0    benzonatate (TESSALON PERLES) 100 mg capsule, Take 1 capsule (100 mg total) by mouth 3 (three) times a day as needed for cough, Disp: 30 capsule, Rfl: 0    EPINEPHrine (EPIPEN) 0.3 mg/0.3 mL SOAJ, INJECT 0.3 ML (0.3 MG TOTAL) INTO A MUSCLE ONCE FOR 1 DOSE (INSURANCE PREFERS MYLAN BRAND), Disp: 2 each, Rfl: 1    levocetirizine (XYZAL) 5 MG tablet, Take 1 tablet (5 mg total) by mouth daily, Disp: 90 tablet, Rfl: 1    mometasone-formoterol (Dulera) 100-5 MCG/ACT inhaler, Inhale 2 puffs 2 (two) times a day Rinse mouth after use., Disp: 13 g, Rfl: 3    predniSONE 10 mg tablet, Take 4 tablets (40 mg total) by mouth daily for 4 days, Disp: 16 tablet, Rfl: 0    Respiratory Therapy Supplies (Nebulizer/Tubing/Mouthpiece) KIT, Use 4 (four) times a day, Disp: 1 kit, Rfl: 0    ZOLMitriptan (ZOMIG) 5 MG tablet, Take 1 tablet (5 mg total) by mouth daily as needed for migraine, Disp: 6 tablet, Rfl: 0    benzonatate (TESSALON PERLES) 100 mg capsule, Take 1 capsule (100 mg total) by mouth 3 (three) times a day as needed for cough (Patient not taking: Reported on 12/21/2024), Disp: 30 capsule, Rfl: 0    benzonatate (TESSALON) 200 MG capsule, Take 1 capsule  (200 mg total) by mouth 3 (three) times a day as needed for cough (Patient not taking: Reported on 2024), Disp: 20 capsule, Rfl: 0    meloxicam (MOBIC) 15 mg tablet, Take 1 tablet (15 mg total) by mouth daily, Disp: 30 tablet, Rfl: 0    miSOPROStol (Cytotec) 200 mcg tablet, Place inside vagina at bedtime, night before IUD insertion. (Patient not taking: Reported on 2024), Disp: 2 tablet, Rfl: 0    triamcinolone (KENALOG) 0.1 % oral topical paste, APPLY 2 APPLICATION. TOPICALLY 2 (TWO) TIMES A DAY (Patient not taking: Reported on 2024), Disp: 10 g, Rfl: 0    Current Facility-Administered Medications:     triamcinolone acetonide (KENALOG-40) 40 mg/mL injection 20 mg, 20 mg, Infiltration, , , 20 mg at 24 0900    Current Allergies     Allergies as of 2024 - Reviewed 2024   Allergen Reaction Noted    Treenut [nuts - food allergy] Anaphylaxis 2022            The following portions of the patient's history were reviewed and updated as appropriate: allergies, current medications, past family history, past medical history, past social history, past surgical history and problem list.     Past Medical History:   Diagnosis Date    Anesthesia complication     pt can still hear, describe whats going on in the room, and feel pressure last 2 surgeries.    Asthma     Candidiasis of mouth     LA....2/26/15    R.....16      Exposure to viral disease     LA....16   R.....3/24/16      Otalgia 2012    LA....12    R....16         Past Surgical History:   Procedure Laterality Date     SECTION      DILATION AND EVACUATION      NASAL FRACTURE SURGERY      MA MNPJ W/ANES SHOULDER JT APPL FIXATION APPARATUS Right 2023    Procedure: Right Shoulder Manipulation Under Anesthesia;  Surgeon: Jose Ewing MD;  Location: [facility];  Service: Orthopedics    MA SURGICAL ARTHROSCOPY SHOULDER W/ROTATOR CUFF RPR Right 2022    Procedure: Shoulder  Arthroscopy with Rotator Cuff Repair, Biceps tenodesis, and subacromial decompression;  Surgeon: Jose Ewing MD;  Location: [facility];  Service: Orthopedics    SHOULDER SURGERY  2022    TONSILLECTOMY      WRIST GANGLION EXCISION Left        Family History   Problem Relation Age of Onset    Colon cancer Mother 60    Hypertension Mother     Depression Mother     COPD Mother     Cancer Mother     Rheum arthritis Father     Hyperlipidemia Father     Depression Father     Brain cancer Maternal Grandmother     Diabetes type II Maternal Grandfather     Diabetes Maternal Grandfather     Lung cancer Paternal Uncle     No Known Problems Maternal Uncle          Medications have been verified.        Objective   /76   Pulse 60   Temp 97.8 °F (36.6 °C)   Resp 14   SpO2 100%        Physical Exam     Physical Exam  Vitals reviewed.   Constitutional:       General: She is not in acute distress.     Appearance: Normal appearance. She is well-developed and normal weight. She is not ill-appearing, toxic-appearing or diaphoretic.   HENT:      Head: Normocephalic and atraumatic.      Right Ear: Tympanic membrane and ear canal normal. No drainage, swelling or tenderness. No middle ear effusion. Tympanic membrane is not erythematous.      Left Ear: Tympanic membrane and ear canal normal. No drainage, swelling or tenderness.  No middle ear effusion. Tympanic membrane is not erythematous.      Nose: No congestion or rhinorrhea.      Mouth/Throat:      Mouth: Mucous membranes are moist. No oral lesions.      Pharynx: Oropharynx is clear. Uvula midline. No pharyngeal swelling, oropharyngeal exudate, posterior oropharyngeal erythema or uvula swelling.      Tonsils: No tonsillar exudate or tonsillar abscesses. 0 on the right. 0 on the left.   Eyes:      Extraocular Movements:      Right eye: Normal extraocular motion.      Left eye: Normal extraocular motion.      Conjunctiva/sclera: Conjunctivae normal.      Pupils:  Pupils are equal, round, and reactive to light.   Cardiovascular:      Rate and Rhythm: Normal rate and regular rhythm.      Heart sounds: Normal heart sounds. No murmur heard.     No friction rub. No gallop.   Pulmonary:      Effort: Pulmonary effort is normal. No respiratory distress.      Breath sounds: Normal breath sounds. No stridor. No wheezing, rhonchi or rales.   Chest:      Chest wall: No tenderness.   Abdominal:      General: Abdomen is flat. Bowel sounds are normal. There is no distension.      Palpations: Abdomen is soft.      Tenderness: There is no abdominal tenderness. There is no guarding.   Musculoskeletal:         General: Normal range of motion.   Skin:     General: Skin is warm and dry.      Capillary Refill: Capillary refill takes less than 2 seconds.   Neurological:      Mental Status: She is alert.

## 2025-01-13 ENCOUNTER — OFFICE VISIT (OUTPATIENT)
Age: 50
End: 2025-01-13
Payer: COMMERCIAL

## 2025-01-13 VITALS — HEIGHT: 65 IN | WEIGHT: 155 LBS | BODY MASS INDEX: 25.83 KG/M2

## 2025-01-13 DIAGNOSIS — L81.4 LENTIGO: ICD-10-CM

## 2025-01-13 DIAGNOSIS — L82.1 SEBORRHEIC KERATOSIS: ICD-10-CM

## 2025-01-13 DIAGNOSIS — D22.9 MULTIPLE MELANOCYTIC NEVI: Primary | ICD-10-CM

## 2025-01-13 DIAGNOSIS — D18.01 CHERRY ANGIOMA: ICD-10-CM

## 2025-01-13 DIAGNOSIS — L30.8 OTHER ECZEMA: ICD-10-CM

## 2025-01-13 PROCEDURE — 99204 OFFICE O/P NEW MOD 45 MIN: CPT | Performed by: DERMATOLOGY

## 2025-01-13 RX ORDER — HYDROCORTISONE 25 MG/G
OINTMENT TOPICAL
Qty: 20 G | Refills: 2 | Status: SHIPPED | OUTPATIENT
Start: 2025-01-13

## 2025-01-13 NOTE — PROGRESS NOTES
"[facility] Dermatology Clinic Note     Patient Name: Shannan Deleon  Encounter Date: 1/13/2025     Have you been cared for by a [facility] Dermatologist in the last 3 years and, if so, which description applies to you?    NO.   I am considered a \"new\" patient and must complete all patient intake questions. I am FEMALE/of child-bearing potential.    REVIEW OF SYSTEMS:  Have you recently had or currently have any of the following? Recent fever or chills? No  Any non-healing wound? No  Are you pregnant or planning to become pregnant? No  Are you currently or planning to be nursing or breast feeding? No   PAST MEDICAL HISTORY:  Have you personally ever had or currently have any of the following?  If \"YES,\" then please provide more detail. Skin cancer (such as Melanoma, Basal Cell Carcinoma, Squamous Cell Carcinoma?  No  Tuberculosis, HIV/AIDS, Hepatitis B or C: No  Radiation Treatment No   HISTORY OF IMMUNOSUPPRESSION:   Do you have a history of any of the following:  Systemic Immunosuppression such as Diabetes, Biologic or Immunotherapy, Chemotherapy, Organ Transplantation, Bone Marrow Transplantation or Prednsione?  No    Answering \"YES\" requires the addition of the dotphrase \"IMMUNOSUPPRESSED\" as the first diagnosis of the patient's visit.   FAMILY HISTORY:  Any \"first degree relatives\" (parent, brother, sister, or child) with the following?    Skin Cancer, Pancreatic or Other Cancer? YES, mother had colon cancer   PATIENT EXPERIENCE:    Do you want the Dermatologist to perform a COMPLETE skin exam today including a clinical examination under the \"bra and underwear\" areas?  Yes  If necessary, do we have your permission to call and leave a detailed message on your Preferred Phone number that includes your specific medical information?  Yes      Allergies   Allergen Reactions   • Treenut [Nuts - Food Allergy] Anaphylaxis      Current Outpatient Medications:   •  albuterol (2.5 mg/3 mL) 0.083 % nebulizer solution, " Take 3 mL (2.5 mg total) by nebulization every 6 (six) hours as needed for wheezing or shortness of breath, Disp: 180 mL, Rfl: 0  •  albuterol (PROVENTIL HFA,VENTOLIN HFA) 90 mcg/act inhaler, Inhale 2 puffs every 6 hours as needed for wheezing, Disp: 18 g, Rfl: 1  •  EPINEPHrine (EPIPEN) 0.3 mg/0.3 mL SOAJ, INJECT 0.3 ML (0.3 MG TOTAL) INTO A MUSCLE ONCE FOR 1 DOSE (INSURANCE PREFERS MYLAN BRAND), Disp: 2 each, Rfl: 1  •  hydrocortisone 2.5 % ointment, Apply topically 2 (two) times a day for two weeks when dryness is flared, Disp: 20 g, Rfl: 2  •  levocetirizine (XYZAL) 5 MG tablet, Take 1 tablet (5 mg total) by mouth daily, Disp: 90 tablet, Rfl: 1  •  mometasone-formoterol (Dulera) 100-5 MCG/ACT inhaler, Inhale 2 puffs 2 (two) times a day Rinse mouth after use., Disp: 13 g, Rfl: 3  •  Respiratory Therapy Supplies (Nebulizer/Tubing/Mouthpiece) KIT, Use 4 (four) times a day, Disp: 1 kit, Rfl: 0  •  ZOLMitriptan (ZOMIG) 5 MG tablet, Take 1 tablet (5 mg total) by mouth daily as needed for migraine, Disp: 6 tablet, Rfl: 0  •  benzonatate (TESSALON PERLES) 100 mg capsule, Take 1 capsule (100 mg total) by mouth 3 (three) times a day as needed for cough (Patient not taking: Reported on 1/13/2025), Disp: 30 capsule, Rfl: 0  •  benzonatate (TESSALON PERLES) 100 mg capsule, Take 1 capsule (100 mg total) by mouth 3 (three) times a day as needed for cough, Disp: 30 capsule, Rfl: 0  •  benzonatate (TESSALON) 200 MG capsule, Take 1 capsule (200 mg total) by mouth 3 (three) times a day as needed for cough (Patient not taking: Reported on 5/11/2024), Disp: 20 capsule, Rfl: 0  •  meloxicam (MOBIC) 15 mg tablet, Take 1 tablet (15 mg total) by mouth daily, Disp: 30 tablet, Rfl: 0  •  miSOPROStol (Cytotec) 200 mcg tablet, Place inside vagina at bedtime, night before IUD insertion. (Patient not taking: Reported on 5/11/2024), Disp: 2 tablet, Rfl: 0  •  triamcinolone (KENALOG) 0.1 % oral topical paste, APPLY 2 APPLICATION. TOPICALLY 2  (TWO) TIMES A DAY (Patient not taking: Reported on 12/8/2023), Disp: 10 g, Rfl: 0    Current Facility-Administered Medications:   •  triamcinolone acetonide (KENALOG-40) 40 mg/mL injection 20 mg, 20 mg, Infiltration, , , 20 mg at 08/08/24 0900          Whom besides the patient is providing clinical information about today's encounter?   NO ADDITIONAL HISTORIAN (patient alone provided history)    Physical Exam and Assessment/Plan by Diagnosis:      Patient is present for skin check, patient notes that she has a lot on dryness on her lips, she notes that this has been a constant for 2 year, she reports that she PCP gave her a steroid cream, she notes that it helped a small bit, she also notes that it comes and goes. She also notes that she has a mole on her lower back that she would like check.    LIP DERMATITIS (ECZEMA)     Physical exam:  Lips    Assessment/Plan:    History and physical consistent with dermatitis  Educated patient on dermatitis, including natural progression of disease with expected periods of quiescence and flaring.   Discussed possbile causing of lip dermatitis - dental hygiene products, preservatives, fragrances, lanolin   Discussed treatment options including general skin care recommendations, topical anti-inflammatories (steroids)  Based on a thorough discussion of this condition and the management approach to it (including a comprehensive discussion of the known risks, side effects and potential benefits of treatment), the patient (family) agrees to implement the following specific plan:    MAINTENANCE - Apply at least 2 times a day every day to all skin even if skin is “normal,” or without scale/redness. Apply once after bath and once any other time during the day. Best to apply moisturizer to moist skin after bath (do not dry off completely before applying)    Moisturizer: Use one of the following:  Cerave healing ointment  Vaseline petroleum jelly  Vanicream      Discussed patch testing for  "patient if dry lips does not get better by next office visit     FLARING -Follow these instructions when the skin is pink or red and itchy.  · For active areas on the LIPS: apply hydrocortisone 2.5% ointment, Apply topically 2 (two) times a day for two weeks when dryness is flared   . Recommend using CereVe healing ointment for the lips  Recommend to make a diet journal to track what foods or drink may cause lip dryness               IF YOU HAVE RUN OUT OF YOUR PRESCRIPTION, PLEASE CALL THE PHARMACY TO CHECK IF THERE IS A REFILL. WE OFTEN SEND MULTIPLE REFILLS.     MULTIPLE MELANOCYTIC NEVI (\"Moles\")    Physical Exam:  Anatomic Location Affected:   Mostly on sun-exposed areas of the trunk and extremities  Morphological Description:  Scattered, 1-4mm round to ovoid, symmetrical-appearing, even bordered, skin colored to dark brown macules/papules, mostly in sun-exposed areas  Pertinent Positives:  Pertinent Negatives:    Additional History of Present Condition:  present on exam     Assessment and Plan:  Based on a thorough discussion of this condition and the management approach to it (including a comprehensive discussion of the known risks, side effects and potential benefits of treatment), the patient (family) agrees to implement the following specific plan:  When outside we recommend using a wide brim hat, sunglasses, long sleeve and pants, sunscreen with SPF 30+ with reapplication every 2 hours, or SPF specific clothing   Benign, reassured  Annual skin check     Melanocytic Nevi  Melanocytic nevi (\"moles\") are tan or brown, raised or flat areas of the skin which have an increased number of melanocytes. Melanocytes are the cells in our body which make pigment and account for skin color.    Some moles are present at birth (I.e., \"congenital nevi\"), while others come up later in life (i.e., \"acquired nevi\").  The sun can stimulate the body to make more moles.  Sunburns are not the only thing that triggers more moles. " " Chronic sun exposure can do it too.     Clinically distinguishing a healthy mole from melanoma may be difficult, even for experienced dermatologists. The \"ABCDE's\" of moles have been suggested as a means of helping to alert a person to a suspicious mole and the possible increased risk of melanoma.  The suggestions for raising alert are as follows:    Asymmetry: Healthy moles tend to be symmetric, while melanomas are often asymmetric.  Asymmetry means if you draw a line through the mole, the two halves do not match in color, size, shape, or surface texture. Asymmetry can be a result of rapid enlargement of a mole, the development of a raised area on a previously flat lesion, scaling, ulceration, bleeding or scabbing within the mole.  Any mole that starts to demonstrate \"asymmetry\" should be examined promptly by a board certified dermatologist.     Border: Healthy moles tend to have discrete, even borders.  The border of a melanoma often blends into the normal skin and does not sharply delineate the mole from normal skin.  Any mole that starts to demonstrate \"uneven borders\" should be examined promptly by a board certified dermatologist.     Color: Healthy moles tend to be one color throughout.  Melanomas tend to be made up of different colors ranging from dark black, blue, white, or red.  Any mole that demonstrates a color change should be examined promptly by a board certified dermatologist.     Diameter: Healthy moles tend to be smaller than 0.6 cm in size; an exception are \"congenital nevi\" that can be larger.  Melanomas tend to grow and can often be greater than 0.6 cm (1/4 of an inch, or the size of a pencil eraser). This is only a guideline, and many normal moles may be larger than 0.6 cm without being unhealthy.  Any mole that starts to change in size (small to bigger or bigger to smaller) should be examined promptly by a board certified dermatologist.     Evolving: Healthy moles tend to \"stay the same.\"  " Melanomas may often show signs of change or evolution such as a change in size, shape, color, or elevation.  Any mole that starts to itch, bleed, crust, burn, hurt, or ulcerate or demonstrate a change or evolution should be examined promptly by a board certified dermatologist.        LENTIGO    Physical Exam:  Anatomic Location Affected:  trunk, arms  Morphological Description:  Light brown macules  Pertinent Positives:  Pertinent Negatives:    Additional History of Present Condition:  present on exam     Assessment and Plan:  Based on a thorough discussion of this condition and the management approach to it (including a comprehensive discussion of the known risks, side effects and potential benefits of treatment), the patient (family) agrees to implement the following specific plan:  When outside we recommend using a wide brim hat, sunglasses, long sleeve and pants, sunscreen with SPF 30+ with reapplication every 2 hours, or SPF specific clothing       What is a lentigo?  A lentigo is a pigmented flat or slightly raised lesion with a clearly defined edge. Unlike an ephelis (freckle), it does not fade in the winter months. There are several kinds of lentigo.  The name lentigo originally referred to its appearance resembling a small lentil. The plural of lentigo is lentigines, although “lentigos” is also in common use.    Who gets lentigines?  Lentigines can affect males and females of all ages and races. Solar lentigines are especially prevalent in fair skinned adults. Lentigines associated with syndromes are present at birth or arise during childhood.    What causes lentigines?  Common forms of lentigo are due to exposure to ultraviolet radiation:  Sun damage including sunburn   Indoor tanning   Phototherapy, especially photochemotherapy (PUVA)    Ionizing radiation, eg radiation therapy, can also cause lentigines.  Several familial syndromes associated with widespread lentigines originate from mutations in Raulito-MAP  "kinase, mTOR signaling and PTEN pathways.    What is the treatment for lentigines?  Most lentigines are left alone. Attempts to lighten them may not be successful. The following approaches are used:  SPF 50+ broad-spectrum sunscreen   Hydroquinone bleaching cream   Alpha hydroxy acids   Vitamin C   Retinoids   Azelaic acid   Chemical peels  Individual lesions can be permanently removed using:  Cryotherapy   Intense pulsed light   Pigment lasers    How can lentigines be prevented?  Lentigines associated with exposure ultraviolet radiation can be prevented by very careful sun protection. Clothing is more successful at preventing new lentigines than are sunscreens.    What is the outlook for lentigines?  Lentigines usually persist. They may increase in number with age and sun exposure. Some in sun-protected sites may fade and disappear.    TAYLOR ANGIOMAS    Physical Exam:  Anatomic Location Affected:  left forehead   Morphological Description:  red to purple colored macules  Pertinent Positives:  Pertinent Negatives:    Additional History of Present Condition:  Noted on exam     Assessment and Plan:    Educated that these are benign  Educated that removal is considered aesthetic and would incur a fee.   Discussed removal at [facility]       SEBORRHEIC KERATOSIS; NON-INFLAMED    Physical Exam:  Anatomic Location Affected:  right cheek   Morphological Description:  Flat and raised, waxy, smooth to warty textured, yellow to brownish-grey to dark brown to blackish, discrete, \"stuck-on\" appearing papules.  Pertinent Positives:  Pertinent Negatives:    Additional History of Present Condition:  Patient reports new bumps on the skin.  Denies itch, burn, pain, bleeding or ulceration.  Present constantly; nothing seems to make it worse or better.  No prior treatment.      Assessment and Plan:  Based on a thorough discussion of this condition and the management approach to it (including a comprehensive discussion of the known " "risks, side effects and potential benefits of treatment), the patient (family) agrees to implement the following specific plan:    Educated that these are benign  Educated that removal is considered aesthetic and would incur a fee.     Seborrheic Keratosis  A seborrheic keratosis is a harmless warty spot that appears during adult life as a common sign of skin aging.  Seborrheic keratoses can arise on any area of skin, covered or uncovered, with the usual exception of the palms and soles. They do not arise from mucous membranes. Seborrheic keratoses can have highly variable appearance.      Seborrheic keratoses are extremely common. It has been estimated that over 90% of adults over the age of 60 years have one or more of them. They occur in males and females of all races, typically beginning to erupt in the 30s or 40s. They are uncommon under the age of 20 years.  The precise cause of seborrhoeic keratoses is not known.  Seborrhoeic keratoses are considered degenerative in nature. As time goes by, seborrheic keratoses tend to become more numerous. Some people inherit a tendency to develop a very large number of them; some people may have hundreds of them.    The name \"seborrheic keratosis\" is misleading, because these lesions are not limited to a seborrhoeic distribution (scalp, mid-face, chest, upper back), nor are they formed from sebaceous glands, nor are they associated with sebum -- which is greasy.  Seborrheic keratosis may also be called \"SK,\" \"Seb K,\" \"basal cell papilloma,\" \"senile wart,\" or \"barnacle.\"      Researchers have noted:  Eruptive seborrhoeic keratoses can follow sunburn or dermatitis  Skin friction may be the reason they appear in body folds  Viral cause (e.g., human papillomavirus) seems unlikely  Stable and clonal mutations or activation of FRFR3, PIK3CA, MARY, AKT1 and EGFR genes are found in seborrhoeic keratoses  Seborrhoeic keratosis can arise from solar lentigo  FRFR3 mutations also arise " "in solar lentigines. These mutations are associated with increased age and location on the head and neck, suggesting a role of ultraviolet radiation in these lesions  Seborrheic keratoses do not harbour tumour suppressor gene mutations  Epidermal growth factor receptor inhibitors, which are used to treat some cancers, often result in an increase in verrucal (warty) keratoses.    There is no easy way to remove multiple lesions on a single occasion.  Unless a specific lesion is \"inflamed\" and is causing pain or stinging/burning or is bleeding, most insurance companies do not authorize treatment.         Scribe Attestation    I,:  Tracy Gallardo MA am acting as a scribe while in the presence of the attending physician.:       I,:  Linda Hernadez MD personally performed the services described in this documentation    as scribed in my presence.:          "

## 2025-06-28 ENCOUNTER — NURSE TRIAGE (OUTPATIENT)
Dept: OTHER | Facility: OTHER | Age: 50
End: 2025-06-28

## 2025-06-28 DIAGNOSIS — J45.30 MILD PERSISTENT ASTHMA, UNSPECIFIED WHETHER COMPLICATED: ICD-10-CM

## 2025-06-28 DIAGNOSIS — G43.009 MIGRAINE WITHOUT AURA AND WITHOUT STATUS MIGRAINOSUS, NOT INTRACTABLE: ICD-10-CM

## 2025-06-28 RX ORDER — ZOLMITRIPTAN 5 MG/1
5 TABLET, FILM COATED ORAL DAILY PRN
Qty: 6 TABLET | Refills: 0 | Status: SHIPPED | OUTPATIENT
Start: 2025-06-28

## 2025-06-28 RX ORDER — ALBUTEROL SULFATE 90 UG/1
INHALANT RESPIRATORY (INHALATION)
Qty: 18 G | Refills: 0 | Status: SHIPPED | OUTPATIENT
Start: 2025-06-28

## 2025-06-28 NOTE — TELEPHONE ENCOUNTER
"Reason for Disposition   [1] Caller requesting a prescription renewal (no refills left), no triage required, AND [2] triager able to renew prescription per department policy    Answer Assessment - Initial Assessment Questions  1. DRUG NAME: \"What medicine do you need to have refilled?\"        Zomig and albuterol inhaler    Currently in [place]      2 . PRESCRIBING HCP: \"Who prescribed it?\" Reason: If prescribed by specialist, call should be referred to that group.        Roxane    Protocols used: Medication Refill and Renewal Call-Adult-[facility]      Refill sent per protocol  "

## 2025-06-28 NOTE — TELEPHONE ENCOUNTER
"Regarding: Med Refills/Currently in [place].  ----- Message from Christie BRADSHAW sent at 6/28/2025  9:38 AM EDT -----  Patient stated, \"I need a few refills, but I am currently in [place].\"    Medication: OLMitriptan (ZOMIG) 5 MG tablet [[id number]]    Dose/Frequency: Take 1 tablet (5 mg total) by mouth daily as needed for migraine    Quantity: 6 tablet (6 day supply)    Pharmacy: [facility], [address] [place] [phone]    Office:   [x ] PCP/Provider -   [ ] Speciality/Provider -     Does the patient have enough for 3 days?   [ ] Yes   [x ] No - Send as HP to POD      Medication: albuterol (PROVENTIL HFA,VENTOLIN HFA) 90 mcg/act inhaler     Dose/Frequency:  Inhale 2 puffs every 6 hours as needed for wheezing    Quantity: 18 g    Pharmacy: [facility], [address] [place] [phone]    Office:   [ x] PCP/Provider -   [ ] Speciality/Provider -     Does the patient have enough for 3 days?   [ ] Yes   [x ] No - Send as HP to POD    "